# Patient Record
Sex: FEMALE | Race: WHITE | NOT HISPANIC OR LATINO | URBAN - METROPOLITAN AREA
[De-identification: names, ages, dates, MRNs, and addresses within clinical notes are randomized per-mention and may not be internally consistent; named-entity substitution may affect disease eponyms.]

---

## 2021-06-30 LAB
EXT 24 HR UR METANEPHRINE: ABNORMAL
EXT 24 HR UR NORMETANEPHRINE: ABNORMAL
EXT 24 HR UR NORMETANEPHRINE: ABNORMAL
EXT 25 HYDROXY VIT D2: ABNORMAL
EXT 25 HYDROXY VIT D3: ABNORMAL
EXT 5 HIAA 24 HR URINE: ABNORMAL
EXT 5 HIAA BLOOD: ABNORMAL
EXT ACTH: ABNORMAL
EXT AFP: ABNORMAL
EXT ALBUMIN: 4.2 G/DL (ref 3.2–4.8)
EXT ALKALINE PHOSPHATASE: 90 IU/L (ref 46–116)
EXT ALT: 20 U/L (ref 10–49)
EXT AMYLASE: ABNORMAL
EXT ANTI ISLET CELL AB: ABNORMAL
EXT ANTI PARIETAL CELL AB: ABNORMAL
EXT ANTI THYROID AB: ABNORMAL
EXT AST: 19 IU/L (ref 0–34)
EXT BILIRUBIN DIRECT: ABNORMAL
EXT BILIRUBIN TOTAL: 0.6 MG/DL (ref 0.3–1.2)
EXT BK VIRUS DNA QN PCR: ABNORMAL
EXT BUN: 10 MG/DL (ref 9–23)
EXT C PEPTIDE: ABNORMAL
EXT CA 125: ABNORMAL
EXT CA 19-9: ABNORMAL
EXT CA 27-29: ABNORMAL
EXT CALCITONIN: ABNORMAL
EXT CALCIUM: 8.9 MG/DL (ref 8.3–10.6)
EXT CEA: ABNORMAL
EXT CHLORIDE: 105 MMOL/L (ref 98–107)
EXT CHOLESTEROL: ABNORMAL
EXT CHROMOGRANIN A: 262 NG/ML (ref 0–96)
EXT CO2: 31 MMOL/L (ref 20–31)
EXT CREATININE UA: ABNORMAL
EXT CREATININE: 0.73 MG/DL (ref 0.55–1.02)
EXT CYCLOSPORONE LEVEL: ABNORMAL
EXT DOPAMINE: ABNORMAL
EXT EBV DNA BY PCR: ABNORMAL
EXT EPINEPHRINE: ABNORMAL
EXT FOLATE: ABNORMAL
EXT FREE T3: ABNORMAL
EXT FREE T4: ABNORMAL
EXT FSH: ABNORMAL
EXT GASTRIN RELEASING PEPTIDE: ABNORMAL
EXT GASTRIN RELEASING PEPTIDE: ABNORMAL
EXT GASTRIN: ABNORMAL
EXT GGT: ABNORMAL
EXT GHRELIN: ABNORMAL
EXT GLUCAGON: ABNORMAL
EXT GLUCOSE: 103 MG/DL (ref 70–99)
EXT GROWTH HORMONE: ABNORMAL
EXT HCV RNA QUANT PCR: ABNORMAL
EXT HDL: ABNORMAL
EXT HEMATOCRIT: 41.5 % (ref 34.3–45.7)
EXT HEMOGLOBIN A1C: ABNORMAL
EXT HEMOGLOBIN: 13.4 G/DL (ref 11.3–15.4)
EXT HISTAMINE 24 HR URINE: ABNORMAL
EXT HISTAMINE: ABNORMAL
EXT IGF-1: ABNORMAL
EXT IMMUNKNOW (NON-STIMULATED): ABNORMAL
EXT IMMUNKNOW (STIMULATED): ABNORMAL
EXT INR: ABNORMAL
EXT INSULIN: ABNORMAL
EXT LANREOTIDE LEVEL: ABNORMAL
EXT LDH, TOTAL: ABNORMAL
EXT LDL CHOLESTEROL: ABNORMAL
EXT LIPASE: ABNORMAL
EXT MAGNESIUM: ABNORMAL
EXT METANEPHRINE FREE PLASMA: ABNORMAL
EXT MOTILIN: ABNORMAL
EXT NEUROKININ A CAMB: ABNORMAL
EXT NEUROKININ A ISI: ABNORMAL
EXT NEUROTENSIN: ABNORMAL
EXT NOREPINEPHRINE: ABNORMAL
EXT NORMETANEPHRINE: ABNORMAL
EXT NSE: ABNORMAL
EXT OCTREOTIDE LEVEL: ABNORMAL
EXT PANCREASTATIN CAMB: ABNORMAL
EXT PANCREASTATIN ISI: ABNORMAL
EXT PANCREATIC POLYPEPTIDE: ABNORMAL
EXT PHOSPHORUS: ABNORMAL
EXT PLATELETS: 304 K/UL (ref 117–369)
EXT POTASSIUM: 3.1 MMOL/L (ref 3.5–5.1)
EXT PROGRAF LEVEL: ABNORMAL
EXT PROLACTIN: ABNORMAL
EXT PROTEIN TOTAL: 6 G/DL (ref 5.7–8.2)
EXT PROTEIN UA: ABNORMAL
EXT PT: ABNORMAL
EXT PTH, INTACT: ABNORMAL
EXT PTT: ABNORMAL
EXT RAPAMUNE LEVEL: ABNORMAL
EXT SEROTONIN: 1379 NG/ML (ref 50–220)
EXT SODIUM: 146 MMOL/L (ref 136–147)
EXT SOMATOSTATIN: ABNORMAL
EXT SUBSTANCE P: ABNORMAL
EXT TRIGLYCERIDES: ABNORMAL
EXT TRYPTASE: ABNORMAL
EXT TSH: ABNORMAL
EXT URIC ACID: ABNORMAL
EXT URINE AMYLASE U/HR: ABNORMAL
EXT URINE AMYLASE U/L: ABNORMAL
EXT VASOACTIVE INTESTINAL POLYPEPTIDE: ABNORMAL
EXT VITAMIN B12: ABNORMAL
EXT VMA 24 HR URINE: ABNORMAL
EXT WBC: 6.7 K/UL (ref 4.4–10.1)
NEURON SPECIFIC ENOLASE: ABNORMAL

## 2021-07-14 ENCOUNTER — TELEPHONE (OUTPATIENT)
Dept: NEUROLOGY | Facility: HOSPITAL | Age: 71
End: 2021-07-14

## 2021-07-16 DIAGNOSIS — C7B.8 SECONDARY NEUROENDOCRINE TUMOR OF LIVER: ICD-10-CM

## 2021-07-16 DIAGNOSIS — C7A.095 MALIGNANT CARCINOID TUMOR OF MIDGUT: Primary | ICD-10-CM

## 2021-07-19 ENCOUNTER — TELEPHONE (OUTPATIENT)
Dept: NEUROLOGY | Facility: HOSPITAL | Age: 71
End: 2021-07-19

## 2021-07-20 ENCOUNTER — TELEPHONE (OUTPATIENT)
Dept: NEUROLOGY | Facility: HOSPITAL | Age: 71
End: 2021-07-20

## 2021-07-20 LAB
EXT 24 HR UR METANEPHRINE: ABNORMAL
EXT 24 HR UR NORMETANEPHRINE: ABNORMAL
EXT 24 HR UR NORMETANEPHRINE: ABNORMAL
EXT 25 HYDROXY VIT D2: ABNORMAL
EXT 25 HYDROXY VIT D3: ABNORMAL
EXT 5 HIAA 24 HR URINE: ABNORMAL
EXT 5 HIAA BLOOD: ABNORMAL
EXT ACTH: ABNORMAL
EXT AFP: ABNORMAL
EXT ALBUMIN: ABNORMAL
EXT ALKALINE PHOSPHATASE: ABNORMAL
EXT ALT: ABNORMAL
EXT AMYLASE: ABNORMAL
EXT ANTI ISLET CELL AB: ABNORMAL
EXT ANTI PARIETAL CELL AB: ABNORMAL
EXT ANTI THYROID AB: ABNORMAL
EXT AST: ABNORMAL
EXT BILIRUBIN DIRECT: ABNORMAL
EXT BILIRUBIN TOTAL: ABNORMAL
EXT BK VIRUS DNA QN PCR: ABNORMAL
EXT BUN: ABNORMAL
EXT C PEPTIDE: ABNORMAL
EXT CA 125: ABNORMAL
EXT CA 19-9: ABNORMAL
EXT CA 27-29: ABNORMAL
EXT CALCITONIN: ABNORMAL
EXT CALCIUM: ABNORMAL
EXT CEA: ABNORMAL
EXT CHLORIDE: ABNORMAL
EXT CHOLESTEROL: ABNORMAL
EXT CHROMOGRANIN A: ABNORMAL
EXT CO2: ABNORMAL
EXT CREATININE UA: ABNORMAL
EXT CREATININE: ABNORMAL
EXT CYCLOSPORONE LEVEL: ABNORMAL
EXT DOPAMINE: ABNORMAL
EXT EBV DNA BY PCR: ABNORMAL
EXT EPINEPHRINE: ABNORMAL
EXT FOLATE: ABNORMAL
EXT FREE T3: ABNORMAL
EXT FREE T4: ABNORMAL
EXT FSH: ABNORMAL
EXT GASTRIN RELEASING PEPTIDE: ABNORMAL
EXT GASTRIN RELEASING PEPTIDE: ABNORMAL
EXT GASTRIN: ABNORMAL
EXT GGT: ABNORMAL
EXT GHRELIN: ABNORMAL
EXT GLUCAGON: ABNORMAL
EXT GLUCOSE: ABNORMAL
EXT GROWTH HORMONE: ABNORMAL
EXT HCV RNA QUANT PCR: ABNORMAL
EXT HDL: ABNORMAL
EXT HEMATOCRIT: ABNORMAL
EXT HEMOGLOBIN A1C: ABNORMAL
EXT HEMOGLOBIN: ABNORMAL
EXT HISTAMINE 24 HR URINE: ABNORMAL
EXT HISTAMINE: ABNORMAL
EXT IGF-1: ABNORMAL
EXT IMMUNKNOW (NON-STIMULATED): ABNORMAL
EXT IMMUNKNOW (STIMULATED): ABNORMAL
EXT INR: ABNORMAL
EXT INSULIN: ABNORMAL
EXT LANREOTIDE LEVEL: ABNORMAL
EXT LDH, TOTAL: ABNORMAL
EXT LDL CHOLESTEROL: ABNORMAL
EXT LIPASE: ABNORMAL
EXT MAGNESIUM: ABNORMAL
EXT METANEPHRINE FREE PLASMA: ABNORMAL
EXT MOTILIN: ABNORMAL
EXT NEUROKININ A CAMB: ABNORMAL
EXT NEUROKININ A ISI: ABNORMAL
EXT NEUROTENSIN: ABNORMAL
EXT NOREPINEPHRINE: ABNORMAL
EXT NORMETANEPHRINE: ABNORMAL
EXT NSE: ABNORMAL
EXT OCTREOTIDE LEVEL: ABNORMAL
EXT PANCREASTATIN CAMB: ABNORMAL
EXT PANCREASTATIN ISI: 852.8 PG/ML (ref 100–288.7)
EXT PANCREATIC POLYPEPTIDE: ABNORMAL
EXT PHOSPHORUS: ABNORMAL
EXT PLATELETS: ABNORMAL
EXT POTASSIUM: ABNORMAL
EXT PROGRAF LEVEL: ABNORMAL
EXT PROLACTIN: ABNORMAL
EXT PROTEIN TOTAL: ABNORMAL
EXT PROTEIN UA: ABNORMAL
EXT PT: ABNORMAL
EXT PTH, INTACT: ABNORMAL
EXT PTT: ABNORMAL
EXT RAPAMUNE LEVEL: ABNORMAL
EXT SEROTONIN: 1661 NG/ML (ref 50–220)
EXT SODIUM: ABNORMAL
EXT SOMATOSTATIN: ABNORMAL
EXT SUBSTANCE P: ABNORMAL
EXT TRIGLYCERIDES: ABNORMAL
EXT TRYPTASE: ABNORMAL
EXT TSH: ABNORMAL
EXT URIC ACID: ABNORMAL
EXT URINE AMYLASE U/HR: ABNORMAL
EXT URINE AMYLASE U/L: ABNORMAL
EXT VASOACTIVE INTESTINAL POLYPEPTIDE: ABNORMAL
EXT VITAMIN B12: ABNORMAL
EXT VMA 24 HR URINE: ABNORMAL
EXT WBC: ABNORMAL
NEURON SPECIFIC ENOLASE: ABNORMAL

## 2021-07-22 ENCOUNTER — DOCUMENTATION ONLY (OUTPATIENT)
Dept: NEUROLOGY | Facility: HOSPITAL | Age: 71
End: 2021-07-22

## 2021-07-26 ENCOUNTER — OFFICE VISIT (OUTPATIENT)
Dept: NEUROLOGY | Facility: HOSPITAL | Age: 71
End: 2021-07-26
Attending: SURGERY
Payer: MEDICARE

## 2021-07-26 DIAGNOSIS — C7A.095 MALIGNANT CARCINOID TUMOR OF MIDGUT: Primary | ICD-10-CM

## 2021-07-26 DIAGNOSIS — E34.0 CARCINOID SYNDROME: ICD-10-CM

## 2021-07-26 DIAGNOSIS — E31.20 MEN (MULTIPLE ENDOCRINE NEOPLASIA): ICD-10-CM

## 2021-07-26 DIAGNOSIS — C7B.8 SECONDARY NEUROENDOCRINE TUMOR OF LIVER: ICD-10-CM

## 2021-07-26 RX ORDER — PREGABALIN 75 MG/1
75 CAPSULE ORAL
Status: CANCELLED | OUTPATIENT
Start: 2021-07-26

## 2021-07-26 RX ORDER — ONDANSETRON 2 MG/ML
8 INJECTION INTRAMUSCULAR; INTRAVENOUS
Status: CANCELLED | OUTPATIENT
Start: 2021-07-26

## 2021-07-28 ENCOUNTER — TELEPHONE (OUTPATIENT)
Dept: NEUROLOGY | Facility: HOSPITAL | Age: 71
End: 2021-07-28

## 2021-07-30 ENCOUNTER — TELEPHONE (OUTPATIENT)
Dept: NEUROLOGY | Facility: HOSPITAL | Age: 71
End: 2021-07-30

## 2021-08-02 ENCOUNTER — CONFERENCE (OUTPATIENT)
Dept: NEUROLOGY | Facility: HOSPITAL | Age: 71
End: 2021-08-02

## 2021-08-03 ENCOUNTER — PATIENT MESSAGE (OUTPATIENT)
Dept: NEUROLOGY | Facility: HOSPITAL | Age: 71
End: 2021-08-03

## 2021-08-03 DIAGNOSIS — C7B.8 SECONDARY NEUROENDOCRINE TUMOR OF LIVER: Primary | ICD-10-CM

## 2021-08-17 ENCOUNTER — PATIENT MESSAGE (OUTPATIENT)
Dept: NEUROLOGY | Facility: HOSPITAL | Age: 71
End: 2021-08-17

## 2021-08-20 ENCOUNTER — PATIENT MESSAGE (OUTPATIENT)
Dept: NEUROLOGY | Facility: HOSPITAL | Age: 71
End: 2021-08-20

## 2021-08-25 ENCOUNTER — TELEPHONE (OUTPATIENT)
Dept: NEUROLOGY | Facility: HOSPITAL | Age: 71
End: 2021-08-25

## 2021-08-25 ENCOUNTER — PATIENT MESSAGE (OUTPATIENT)
Dept: NEUROLOGY | Facility: HOSPITAL | Age: 71
End: 2021-08-25

## 2021-08-25 DIAGNOSIS — Z01.818 PRE-OP TESTING: ICD-10-CM

## 2021-08-27 LAB
EXT 24 HR UR METANEPHRINE: ABNORMAL
EXT 24 HR UR NORMETANEPHRINE: ABNORMAL
EXT 24 HR UR NORMETANEPHRINE: ABNORMAL
EXT 25 HYDROXY VIT D2: ABNORMAL
EXT 25 HYDROXY VIT D3: ABNORMAL
EXT 5 HIAA 24 HR URINE: ABNORMAL
EXT 5 HIAA BLOOD: ABNORMAL
EXT ACTH: ABNORMAL
EXT AFP: ABNORMAL
EXT ALBUMIN: 4.6 G/DL (ref 3.2–4.8)
EXT ALKALINE PHOSPHATASE: 96 IU/L (ref 46–116)
EXT ALT: 14 IU/L (ref 10–49)
EXT AMYLASE: ABNORMAL
EXT ANTI ISLET CELL AB: ABNORMAL
EXT ANTI PARIETAL CELL AB: ABNORMAL
EXT ANTI THYROID AB: ABNORMAL
EXT AST: 19 IU/L (ref 0–34)
EXT BILIRUBIN DIRECT: ABNORMAL
EXT BILIRUBIN TOTAL: 0.8 MG/DL (ref 0.3–1.2)
EXT BK VIRUS DNA QN PCR: ABNORMAL
EXT BUN: 8 MG/DL (ref 9–23)
EXT C PEPTIDE: ABNORMAL
EXT CA 125: ABNORMAL
EXT CA 19-9: ABNORMAL
EXT CA 27-29: ABNORMAL
EXT CALCITONIN: ABNORMAL
EXT CALCIUM: 10 MG/DL (ref 8.3–10.6)
EXT CEA: ABNORMAL
EXT CHLORIDE: 106 MMOL/L (ref 98–107)
EXT CHOLESTEROL: ABNORMAL
EXT CHROMOGRANIN A: ABNORMAL
EXT CO2: 28 MMOL/L (ref 20–31)
EXT CREATININE UA: ABNORMAL
EXT CREATININE: 0.86 MG/DL (ref 0.55–1.02)
EXT CYCLOSPORONE LEVEL: ABNORMAL
EXT DOPAMINE: ABNORMAL
EXT EBV DNA BY PCR: ABNORMAL
EXT EPINEPHRINE: ABNORMAL
EXT FOLATE: ABNORMAL
EXT FREE T3: ABNORMAL
EXT FREE T4: ABNORMAL
EXT FSH: ABNORMAL
EXT GASTRIN RELEASING PEPTIDE: ABNORMAL
EXT GASTRIN RELEASING PEPTIDE: ABNORMAL
EXT GASTRIN: ABNORMAL
EXT GGT: ABNORMAL
EXT GHRELIN: ABNORMAL
EXT GLUCAGON: ABNORMAL
EXT GLUCOSE: 173 MG/DL (ref 70–99)
EXT GROWTH HORMONE: ABNORMAL
EXT HCV RNA QUANT PCR: ABNORMAL
EXT HDL: ABNORMAL
EXT HEMATOCRIT: 44.3 % (ref 34.3–45.7)
EXT HEMOGLOBIN A1C: ABNORMAL
EXT HEMOGLOBIN: 14.7 G/DL (ref 11.3–15.4)
EXT HISTAMINE 24 HR URINE: ABNORMAL
EXT HISTAMINE: ABNORMAL
EXT IGF-1: ABNORMAL
EXT IMMUNKNOW (NON-STIMULATED): ABNORMAL
EXT IMMUNKNOW (STIMULATED): ABNORMAL
EXT INR: ABNORMAL
EXT INSULIN: ABNORMAL
EXT LANREOTIDE LEVEL: ABNORMAL
EXT LDH, TOTAL: ABNORMAL
EXT LDL CHOLESTEROL: ABNORMAL
EXT LIPASE: ABNORMAL
EXT MAGNESIUM: ABNORMAL
EXT METANEPHRINE FREE PLASMA: ABNORMAL
EXT MOTILIN: ABNORMAL
EXT NEUROKININ A CAMB: ABNORMAL
EXT NEUROKININ A ISI: ABNORMAL
EXT NEUROTENSIN: ABNORMAL
EXT NOREPINEPHRINE: ABNORMAL
EXT NORMETANEPHRINE: ABNORMAL
EXT NSE: ABNORMAL
EXT OCTREOTIDE LEVEL: ABNORMAL
EXT PANCREASTATIN CAMB: ABNORMAL
EXT PANCREASTATIN ISI: ABNORMAL
EXT PANCREATIC POLYPEPTIDE: ABNORMAL
EXT PHOSPHORUS: ABNORMAL
EXT PLATELETS: 313 K/UL (ref 117–369)
EXT POTASSIUM: 3.7 MMOL/L (ref 3.5–5.1)
EXT PROGRAF LEVEL: ABNORMAL
EXT PROLACTIN: ABNORMAL
EXT PROTEIN TOTAL: 6.7 G/DL (ref 5.7–8.2)
EXT PROTEIN UA: ABNORMAL
EXT PT: ABNORMAL
EXT PTH, INTACT: ABNORMAL
EXT PTT: 28.8 SEC (ref 22.2–34.1)
EXT RAPAMUNE LEVEL: ABNORMAL
EXT SEROTONIN: ABNORMAL
EXT SODIUM: 142 MMOL/L (ref 136–147)
EXT SOMATOSTATIN: ABNORMAL
EXT SUBSTANCE P: ABNORMAL
EXT TRIGLYCERIDES: ABNORMAL
EXT TRYPTASE: ABNORMAL
EXT TSH: ABNORMAL
EXT URIC ACID: ABNORMAL
EXT URINE AMYLASE U/HR: ABNORMAL
EXT URINE AMYLASE U/L: ABNORMAL
EXT VASOACTIVE INTESTINAL POLYPEPTIDE: ABNORMAL
EXT VITAMIN B12: ABNORMAL
EXT VMA 24 HR URINE: ABNORMAL
EXT WBC: 6.9 K/UL (ref 4.4–10.1)
NEURON SPECIFIC ENOLASE: ABNORMAL

## 2021-09-20 ENCOUNTER — PATIENT MESSAGE (OUTPATIENT)
Dept: NEUROLOGY | Facility: HOSPITAL | Age: 71
End: 2021-09-20

## 2021-09-21 LAB
EXT 24 HR UR METANEPHRINE: ABNORMAL
EXT 24 HR UR NORMETANEPHRINE: ABNORMAL
EXT 24 HR UR NORMETANEPHRINE: ABNORMAL
EXT 25 HYDROXY VIT D2: ABNORMAL
EXT 25 HYDROXY VIT D3: ABNORMAL
EXT 5 HIAA 24 HR URINE: ABNORMAL
EXT 5 HIAA BLOOD: ABNORMAL
EXT ACTH: ABNORMAL
EXT AFP: ABNORMAL
EXT ALBUMIN: 4.1 G/DL (ref 3.2–4.8)
EXT ALKALINE PHOSPHATASE: 79 IU/L (ref 46–116)
EXT ALT: 14 IU/L (ref 10–49)
EXT AMYLASE: ABNORMAL
EXT ANTI ISLET CELL AB: ABNORMAL
EXT ANTI PARIETAL CELL AB: ABNORMAL
EXT ANTI THYROID AB: ABNORMAL
EXT AST: 17 IU/L (ref 0–34)
EXT BILIRUBIN DIRECT: ABNORMAL
EXT BILIRUBIN TOTAL: 0.7 MG/DL (ref 0.3–1.2)
EXT BK VIRUS DNA QN PCR: ABNORMAL
EXT BUN: 10 MG/DL (ref 9–23)
EXT C PEPTIDE: ABNORMAL
EXT CA 125: ABNORMAL
EXT CA 19-9: ABNORMAL
EXT CA 27-29: ABNORMAL
EXT CALCITONIN: ABNORMAL
EXT CALCIUM: 9.4 MG/DL (ref 8.3–10.6)
EXT CEA: ABNORMAL
EXT CHLORIDE: 108 MMOL/L (ref 98–107)
EXT CHOLESTEROL: ABNORMAL
EXT CHROMOGRANIN A: ABNORMAL
EXT CO2: 30 MMOL/L (ref 20–31)
EXT CREATININE UA: ABNORMAL
EXT CREATININE: 0.76 MG/DL (ref 0.55–1.02)
EXT CYCLOSPORONE LEVEL: ABNORMAL
EXT DOPAMINE: ABNORMAL
EXT EBV DNA BY PCR: ABNORMAL
EXT EPINEPHRINE: ABNORMAL
EXT FOLATE: ABNORMAL
EXT FREE T3: ABNORMAL
EXT FREE T4: ABNORMAL
EXT FSH: ABNORMAL
EXT GASTRIN RELEASING PEPTIDE: ABNORMAL
EXT GASTRIN RELEASING PEPTIDE: ABNORMAL
EXT GASTRIN: ABNORMAL
EXT GGT: ABNORMAL
EXT GHRELIN: ABNORMAL
EXT GLUCAGON: ABNORMAL
EXT GLUCOSE: 106 MG/DL (ref 70–99)
EXT GROWTH HORMONE: ABNORMAL
EXT HCV RNA QUANT PCR: ABNORMAL
EXT HDL: ABNORMAL
EXT HEMATOCRIT: 42.5 % (ref 34.3–45.7)
EXT HEMOGLOBIN A1C: ABNORMAL
EXT HEMOGLOBIN: 13.2 G/DL (ref 11.3–15.4)
EXT HISTAMINE 24 HR URINE: ABNORMAL
EXT HISTAMINE: ABNORMAL
EXT IGF-1: ABNORMAL
EXT IMMUNKNOW (NON-STIMULATED): ABNORMAL
EXT IMMUNKNOW (STIMULATED): ABNORMAL
EXT INR: 1.05 INR (ref 0.86–1.15)
EXT INSULIN: ABNORMAL
EXT LANREOTIDE LEVEL: ABNORMAL
EXT LDH, TOTAL: ABNORMAL
EXT LDL CHOLESTEROL: ABNORMAL
EXT LIPASE: ABNORMAL
EXT MAGNESIUM: ABNORMAL
EXT METANEPHRINE FREE PLASMA: ABNORMAL
EXT MOTILIN: ABNORMAL
EXT NEUROKININ A CAMB: ABNORMAL
EXT NEUROKININ A ISI: ABNORMAL
EXT NEUROTENSIN: ABNORMAL
EXT NOREPINEPHRINE: ABNORMAL
EXT NORMETANEPHRINE: ABNORMAL
EXT NSE: ABNORMAL
EXT OCTREOTIDE LEVEL: ABNORMAL
EXT PANCREASTATIN CAMB: ABNORMAL
EXT PANCREASTATIN ISI: ABNORMAL
EXT PANCREATIC POLYPEPTIDE: ABNORMAL
EXT PHOSPHORUS: ABNORMAL
EXT PLATELETS: 295 K/UL (ref 117–369)
EXT POTASSIUM: 3.7 MMOL/L (ref 3.5–5.1)
EXT PROGRAF LEVEL: ABNORMAL
EXT PROLACTIN: ABNORMAL
EXT PROTEIN TOTAL: 5.9 G/DL (ref 5.7–8.2)
EXT PROTEIN UA: ABNORMAL
EXT PT: 13.9 SEC (ref 12.7–15.3)
EXT PTH, INTACT: ABNORMAL
EXT PTT: 29.8 SEC (ref 22.2–34.1)
EXT RAPAMUNE LEVEL: ABNORMAL
EXT SEROTONIN: ABNORMAL
EXT SODIUM: 144 MMOL/L (ref 136–147)
EXT SOMATOSTATIN: ABNORMAL
EXT SUBSTANCE P: ABNORMAL
EXT TRIGLYCERIDES: ABNORMAL
EXT TRYPTASE: ABNORMAL
EXT TSH: ABNORMAL
EXT URIC ACID: ABNORMAL
EXT URINE AMYLASE U/HR: ABNORMAL
EXT URINE AMYLASE U/L: ABNORMAL
EXT VASOACTIVE INTESTINAL POLYPEPTIDE: ABNORMAL
EXT VITAMIN B12: ABNORMAL
EXT VMA 24 HR URINE: ABNORMAL
EXT WBC: 5.4 K/UL (ref 4.4–10.1)
NEURON SPECIFIC ENOLASE: ABNORMAL

## 2021-09-22 ENCOUNTER — OFFICE VISIT (OUTPATIENT)
Dept: NEUROLOGY | Facility: HOSPITAL | Age: 71
End: 2021-09-22
Attending: SURGERY
Payer: MEDICARE

## 2021-09-22 DIAGNOSIS — C7A.095 MALIGNANT CARCINOID TUMOR OF MIDGUT: ICD-10-CM

## 2021-09-22 DIAGNOSIS — C7B.8 SECONDARY NEUROENDOCRINE TUMOR OF LIVER: Primary | ICD-10-CM

## 2021-09-23 ENCOUNTER — TELEPHONE (OUTPATIENT)
Dept: INTERVENTIONAL RADIOLOGY/VASCULAR | Facility: HOSPITAL | Age: 71
End: 2021-09-23

## 2021-09-23 RX ORDER — HYDROMORPHONE HYDROCHLORIDE 1 MG/ML
1 INJECTION, SOLUTION INTRAMUSCULAR; INTRAVENOUS; SUBCUTANEOUS EVERY 4 HOURS PRN
Status: CANCELLED | OUTPATIENT
Start: 2021-09-23

## 2021-09-23 RX ORDER — SODIUM CHLORIDE 0.9 % (FLUSH) 0.9 %
10 SYRINGE (ML) INJECTION
Status: CANCELLED | OUTPATIENT
Start: 2021-09-23

## 2021-09-23 RX ORDER — HYDROCODONE BITARTRATE AND ACETAMINOPHEN 5; 325 MG/1; MG/1
1 TABLET ORAL EVERY 4 HOURS PRN
Status: CANCELLED | OUTPATIENT
Start: 2021-09-23

## 2021-09-23 RX ORDER — TALC
6 POWDER (GRAM) TOPICAL NIGHTLY PRN
Status: CANCELLED | OUTPATIENT
Start: 2021-09-23

## 2021-09-23 RX ORDER — DIPHENHYDRAMINE HYDROCHLORIDE 50 MG/ML
50 INJECTION INTRAMUSCULAR; INTRAVENOUS ONCE
Status: CANCELLED | OUTPATIENT
Start: 2021-09-23 | End: 2021-09-23

## 2021-09-23 RX ORDER — MAGNESIUM SULFATE HEPTAHYDRATE 40 MG/ML
2 INJECTION, SOLUTION INTRAVENOUS ONCE
Status: CANCELLED | OUTPATIENT
Start: 2021-09-23 | End: 2021-09-23

## 2021-09-23 RX ORDER — SODIUM CHLORIDE 9 MG/ML
INJECTION, SOLUTION INTRAVENOUS ONCE
Status: CANCELLED | OUTPATIENT
Start: 2021-09-23 | End: 2021-09-23

## 2021-09-23 RX ORDER — ONDANSETRON 8 MG/1
8 TABLET, ORALLY DISINTEGRATING ORAL EVERY 8 HOURS PRN
Status: CANCELLED | OUTPATIENT
Start: 2021-09-23

## 2021-09-23 RX ORDER — DEXTROSE MONOHYDRATE, SODIUM CHLORIDE, AND POTASSIUM CHLORIDE 50; 1.49; 4.5 G/1000ML; G/1000ML; G/1000ML
INJECTION, SOLUTION INTRAVENOUS CONTINUOUS
Status: CANCELLED | OUTPATIENT
Start: 2021-09-23

## 2021-09-23 RX ORDER — IBUPROFEN 600 MG/1
600 TABLET ORAL EVERY 6 HOURS PRN
Status: CANCELLED | OUTPATIENT
Start: 2021-09-23

## 2021-09-23 RX ORDER — METOCLOPRAMIDE HYDROCHLORIDE 5 MG/ML
5 INJECTION INTRAMUSCULAR; INTRAVENOUS EVERY 6 HOURS PRN
Status: CANCELLED | OUTPATIENT
Start: 2021-09-23

## 2021-09-23 RX ORDER — DEXTROSE MONOHYDRATE, SODIUM CHLORIDE, AND POTASSIUM CHLORIDE 50; 1.49; 4.5 G/1000ML; G/1000ML; G/1000ML
75 INJECTION, SOLUTION INTRAVENOUS CONTINUOUS
Status: CANCELLED | OUTPATIENT
Start: 2021-09-23

## 2021-09-24 ENCOUNTER — CLINICAL SUPPORT (OUTPATIENT)
Dept: NEUROLOGY | Facility: HOSPITAL | Age: 71
End: 2021-09-24
Attending: SURGERY
Payer: MEDICARE

## 2021-09-24 ENCOUNTER — HOSPITAL ENCOUNTER (OUTPATIENT)
Dept: INTERVENTIONAL RADIOLOGY/VASCULAR | Facility: HOSPITAL | Age: 71
Discharge: HOME | End: 2021-09-25
Attending: SURGERY | Admitting: SURGERY
Payer: MEDICARE

## 2021-09-24 DIAGNOSIS — T81.718A POST-EMBOLIZATION SYNDROME FOLLOWING CHEMOEMBOLIZATION OF LIVER, INITIAL ENCOUNTER: ICD-10-CM

## 2021-09-24 DIAGNOSIS — R52 PAIN MANAGEMENT: ICD-10-CM

## 2021-09-24 DIAGNOSIS — C7B.8 SECONDARY NEUROENDOCRINE TUMOR OF LIVER: ICD-10-CM

## 2021-09-24 DIAGNOSIS — Z01.818 PRE-OP TESTING: ICD-10-CM

## 2021-09-24 LAB — SARS-COV-2 RDRP RESP QL NAA+PROBE: NEGATIVE

## 2021-09-24 PROCEDURE — 96420 CHEMO IA PUSH TECNIQUE: CPT | Mod: ,,, | Performed by: RADIOLOGY

## 2021-09-24 PROCEDURE — 75726 ARTERY X-RAYS ABDOMEN: CPT | Mod: 26,59,, | Performed by: RADIOLOGY

## 2021-09-24 PROCEDURE — 37243 IR EMBOLIZATION COMP FOR TUMOR_ORGAN ISCHEMIA_INFARC: ICD-10-PCS | Mod: ,,, | Performed by: RADIOLOGY

## 2021-09-24 PROCEDURE — 75726 ARTERY X-RAYS ABDOMEN: CPT | Mod: TC | Performed by: RADIOLOGY

## 2021-09-24 PROCEDURE — 63600175 PHARM REV CODE 636 W HCPCS: Performed by: SURGERY

## 2021-09-24 PROCEDURE — 99152 MOD SED SAME PHYS/QHP 5/>YRS: CPT

## 2021-09-24 PROCEDURE — 76377 3D RENDER W/INTRP POSTPROCES: CPT | Mod: TC | Performed by: RADIOLOGY

## 2021-09-24 PROCEDURE — 36247 INS CATH ABD/L-EXT ART 3RD: CPT | Mod: 51,,, | Performed by: RADIOLOGY

## 2021-09-24 PROCEDURE — 76937 US GUIDE VASCULAR ACCESS: CPT | Mod: 26,,, | Performed by: RADIOLOGY

## 2021-09-24 PROCEDURE — 75887 PR  PERCUT XHEPATIC PORTOGRAM: ICD-10-PCS | Mod: 26,,, | Performed by: RADIOLOGY

## 2021-09-24 PROCEDURE — U0002 COVID-19 LAB TEST NON-CDC: HCPCS | Performed by: SURGERY

## 2021-09-24 PROCEDURE — 76937 US GUIDE VASCULAR ACCESS: CPT | Mod: TC | Performed by: RADIOLOGY

## 2021-09-24 PROCEDURE — 75887 VEIN X-RAY LIVER W/O HEMODYN: CPT | Mod: 26,,, | Performed by: RADIOLOGY

## 2021-09-24 PROCEDURE — 76377 3D RENDER W/INTRP POSTPROCES: CPT | Mod: 26,,, | Performed by: RADIOLOGY

## 2021-09-24 PROCEDURE — 37243 VASC EMBOLIZE/OCCLUDE ORGAN: CPT | Performed by: RADIOLOGY

## 2021-09-24 PROCEDURE — 99153 MOD SED SAME PHYS/QHP EA: CPT | Performed by: RADIOLOGY

## 2021-09-24 PROCEDURE — 96420 PR CHEMOTHER,IA PUSH TECHNIQUE: ICD-10-PCS | Mod: ,,, | Performed by: RADIOLOGY

## 2021-09-24 PROCEDURE — 76937 PR  US GUIDE, VASCULAR ACCESS: ICD-10-PCS | Mod: 26,,, | Performed by: RADIOLOGY

## 2021-09-24 PROCEDURE — 77263 PR  RADIATION THERAPY PLAN COMPLEX: ICD-10-PCS | Mod: ,,, | Performed by: RADIOLOGY

## 2021-09-24 PROCEDURE — 63600175 PHARM REV CODE 636 W HCPCS: Performed by: RADIOLOGY

## 2021-09-24 PROCEDURE — 25500020 PHARM REV CODE 255: Performed by: RADIOLOGY

## 2021-09-24 PROCEDURE — 99211 OFF/OP EST MAY X REQ PHY/QHP: CPT

## 2021-09-24 PROCEDURE — 75774 ARTERY X-RAY EACH VESSEL: CPT | Mod: 26,59,, | Performed by: RADIOLOGY

## 2021-09-24 PROCEDURE — 25000003 PHARM REV CODE 250: Performed by: SURGERY

## 2021-09-24 PROCEDURE — 36248 INS CATH ABD/L-EXT ART ADDL: CPT | Mod: ,,, | Performed by: RADIOLOGY

## 2021-09-24 PROCEDURE — 36248 INS CATH ABD/L-EXT ART ADDL: CPT | Performed by: RADIOLOGY

## 2021-09-24 PROCEDURE — 36247 INS CATH ABD/L-EXT ART 3RD: CPT | Mod: LT | Performed by: RADIOLOGY

## 2021-09-24 PROCEDURE — 36247 PR PLACE CATH SUBSUBSELECT ART,ABD/PEL: ICD-10-PCS | Mod: 51,,, | Performed by: RADIOLOGY

## 2021-09-24 PROCEDURE — 99152 MOD SED SAME PHYS/QHP 5/>YRS: CPT | Performed by: RADIOLOGY

## 2021-09-24 PROCEDURE — 77263 THER RADIOLOGY TX PLNG CPLX: CPT | Mod: ,,, | Performed by: RADIOLOGY

## 2021-09-24 PROCEDURE — 76380 CAT SCAN FOLLOW-UP STUDY: CPT | Mod: 26,59,, | Performed by: RADIOLOGY

## 2021-09-24 PROCEDURE — 75726 CHG ANGIO VISCERAL SELECTV/SUBSELEC: ICD-10-PCS | Mod: 26,59,, | Performed by: RADIOLOGY

## 2021-09-24 PROCEDURE — 36248 PR PR INS CATH ABD/L-EXT ART ADDL 2ND ORD/3RD ORD/BYD: ICD-10-PCS | Mod: ,,, | Performed by: RADIOLOGY

## 2021-09-24 PROCEDURE — 76377 PR  3D RENDERING W/ IMAGE POSTPROCESS: ICD-10-PCS | Mod: 26,,, | Performed by: RADIOLOGY

## 2021-09-24 PROCEDURE — 25000003 PHARM REV CODE 250: Performed by: RADIOLOGY

## 2021-09-24 PROCEDURE — C1894 INTRO/SHEATH, NON-LASER: HCPCS

## 2021-09-24 PROCEDURE — 76380 CAT SCAN FOLLOW-UP STUDY: CPT | Mod: TC,59 | Performed by: RADIOLOGY

## 2021-09-24 PROCEDURE — 75774 ARTERY X-RAY EACH VESSEL: CPT | Mod: TC | Performed by: RADIOLOGY

## 2021-09-24 PROCEDURE — 96420 CHEMO IA PUSH TECNIQUE: CPT | Performed by: RADIOLOGY

## 2021-09-24 PROCEDURE — 99153 MOD SED SAME PHYS/QHP EA: CPT

## 2021-09-24 PROCEDURE — 76380 PR  CT SCAN,LIMITED/LOCALIZED F/U STUDY: ICD-10-PCS | Mod: 26,59,, | Performed by: RADIOLOGY

## 2021-09-24 PROCEDURE — 75774 PR  ANGIO EA ADDNL SELECTV VESSEL: ICD-10-PCS | Mod: 26,59,, | Performed by: RADIOLOGY

## 2021-09-24 PROCEDURE — C1769 GUIDE WIRE: HCPCS

## 2021-09-24 PROCEDURE — G0269 OCCLUSIVE DEVICE IN VEIN ART: HCPCS | Performed by: RADIOLOGY

## 2021-09-24 PROCEDURE — 75887 VEIN X-RAY LIVER W/O HEMODYN: CPT | Mod: TC | Performed by: RADIOLOGY

## 2021-09-24 RX ORDER — TALC
6 POWDER (GRAM) TOPICAL NIGHTLY PRN
Status: DISCONTINUED | OUTPATIENT
Start: 2021-09-24 | End: 2021-09-25 | Stop reason: HOSPADM

## 2021-09-24 RX ORDER — LIDOCAINE HYDROCHLORIDE 10 MG/ML
INJECTION INFILTRATION; PERINEURAL CODE/TRAUMA/SEDATION MEDICATION
Status: COMPLETED | OUTPATIENT
Start: 2021-09-24 | End: 2021-09-24

## 2021-09-24 RX ORDER — METOCLOPRAMIDE HYDROCHLORIDE 5 MG/ML
5 INJECTION INTRAMUSCULAR; INTRAVENOUS EVERY 6 HOURS PRN
Status: DISCONTINUED | OUTPATIENT
Start: 2021-09-24 | End: 2021-09-25 | Stop reason: HOSPADM

## 2021-09-24 RX ORDER — IBUPROFEN 600 MG/1
600 TABLET ORAL EVERY 6 HOURS PRN
Status: DISCONTINUED | OUTPATIENT
Start: 2021-09-24 | End: 2021-09-25 | Stop reason: HOSPADM

## 2021-09-24 RX ORDER — HEPARIN SODIUM 200 [USP'U]/100ML
INJECTION, SOLUTION INTRAVENOUS
Status: COMPLETED | OUTPATIENT
Start: 2021-09-24 | End: 2021-09-24

## 2021-09-24 RX ORDER — HYDRALAZINE HYDROCHLORIDE 20 MG/ML
INJECTION INTRAMUSCULAR; INTRAVENOUS CODE/TRAUMA/SEDATION MEDICATION
Status: COMPLETED | OUTPATIENT
Start: 2021-09-24 | End: 2021-09-24

## 2021-09-24 RX ORDER — PROCHLORPERAZINE EDISYLATE 5 MG/ML
INJECTION INTRAMUSCULAR; INTRAVENOUS CODE/TRAUMA/SEDATION MEDICATION
Status: COMPLETED | OUTPATIENT
Start: 2021-09-24 | End: 2021-09-24

## 2021-09-24 RX ORDER — MIDAZOLAM HYDROCHLORIDE 1 MG/ML
INJECTION INTRAMUSCULAR; INTRAVENOUS CODE/TRAUMA/SEDATION MEDICATION
Status: COMPLETED | OUTPATIENT
Start: 2021-09-24 | End: 2021-09-24

## 2021-09-24 RX ORDER — SODIUM CHLORIDE 0.9 % (FLUSH) 0.9 %
10 SYRINGE (ML) INJECTION
Status: DISCONTINUED | OUTPATIENT
Start: 2021-09-24 | End: 2021-09-25 | Stop reason: HOSPADM

## 2021-09-24 RX ORDER — FENTANYL CITRATE 50 UG/ML
INJECTION, SOLUTION INTRAMUSCULAR; INTRAVENOUS CODE/TRAUMA/SEDATION MEDICATION
Status: COMPLETED | OUTPATIENT
Start: 2021-09-24 | End: 2021-09-24

## 2021-09-24 RX ORDER — PROMETHAZINE HYDROCHLORIDE 25 MG/ML
INJECTION, SOLUTION INTRAMUSCULAR; INTRAVENOUS CODE/TRAUMA/SEDATION MEDICATION
Status: COMPLETED | OUTPATIENT
Start: 2021-09-24 | End: 2021-09-24

## 2021-09-24 RX ORDER — DEXTROSE MONOHYDRATE, SODIUM CHLORIDE, AND POTASSIUM CHLORIDE 50; 1.49; 4.5 G/1000ML; G/1000ML; G/1000ML
75 INJECTION, SOLUTION INTRAVENOUS CONTINUOUS
Status: DISCONTINUED | OUTPATIENT
Start: 2021-09-24 | End: 2021-09-25

## 2021-09-24 RX ORDER — LISINOPRIL 40 MG/1
40 TABLET ORAL DAILY
COMMUNITY

## 2021-09-24 RX ORDER — DEXTROSE MONOHYDRATE, SODIUM CHLORIDE, AND POTASSIUM CHLORIDE 50; 1.49; 4.5 G/1000ML; G/1000ML; G/1000ML
INJECTION, SOLUTION INTRAVENOUS CONTINUOUS
Status: DISCONTINUED | OUTPATIENT
Start: 2021-09-24 | End: 2021-09-25

## 2021-09-24 RX ORDER — ONDANSETRON 8 MG/1
8 TABLET, ORALLY DISINTEGRATING ORAL EVERY 8 HOURS PRN
Status: DISCONTINUED | OUTPATIENT
Start: 2021-09-24 | End: 2021-09-25 | Stop reason: HOSPADM

## 2021-09-24 RX ORDER — MAGNESIUM SULFATE HEPTAHYDRATE 40 MG/ML
2 INJECTION, SOLUTION INTRAVENOUS ONCE
Status: COMPLETED | OUTPATIENT
Start: 2021-09-24 | End: 2021-09-24

## 2021-09-24 RX ORDER — DIPHENHYDRAMINE HYDROCHLORIDE 50 MG/ML
50 INJECTION INTRAMUSCULAR; INTRAVENOUS ONCE
Status: COMPLETED | OUTPATIENT
Start: 2021-09-24 | End: 2021-09-24

## 2021-09-24 RX ORDER — HYDROMORPHONE HYDROCHLORIDE 1 MG/ML
1 INJECTION, SOLUTION INTRAMUSCULAR; INTRAVENOUS; SUBCUTANEOUS EVERY 4 HOURS PRN
Status: DISCONTINUED | OUTPATIENT
Start: 2021-09-24 | End: 2021-09-25 | Stop reason: HOSPADM

## 2021-09-24 RX ORDER — SCOLOPAMINE TRANSDERMAL SYSTEM 1 MG/1
1 PATCH, EXTENDED RELEASE TRANSDERMAL ONCE
Status: DISCONTINUED | OUTPATIENT
Start: 2021-09-24 | End: 2021-09-25 | Stop reason: HOSPADM

## 2021-09-24 RX ORDER — SODIUM CHLORIDE 9 MG/ML
INJECTION, SOLUTION INTRAVENOUS ONCE
Status: COMPLETED | OUTPATIENT
Start: 2021-09-24 | End: 2021-09-24

## 2021-09-24 RX ORDER — HYDROCODONE BITARTRATE AND ACETAMINOPHEN 5; 325 MG/1; MG/1
1 TABLET ORAL EVERY 4 HOURS PRN
Status: DISCONTINUED | OUTPATIENT
Start: 2021-09-24 | End: 2021-09-25 | Stop reason: HOSPADM

## 2021-09-24 RX ADMIN — LIDOCAINE HYDROCHLORIDE 5 ML: 10 INJECTION, SOLUTION INFILTRATION; PERINEURAL at 10:09

## 2021-09-24 RX ADMIN — MIDAZOLAM HYDROCHLORIDE 1 MG: 1 INJECTION, SOLUTION INTRAMUSCULAR; INTRAVENOUS at 10:09

## 2021-09-24 RX ADMIN — MAGNESIUM SULFATE IN WATER 2 G: 40 INJECTION, SOLUTION INTRAVENOUS at 10:09

## 2021-09-24 RX ADMIN — MIDAZOLAM HYDROCHLORIDE 1 MG: 1 INJECTION, SOLUTION INTRAMUSCULAR; INTRAVENOUS at 11:09

## 2021-09-24 RX ADMIN — PROMETHAZINE HYDROCHLORIDE 12.5 MG: 25 INJECTION INTRAMUSCULAR; INTRAVENOUS at 12:09

## 2021-09-24 RX ADMIN — SODIUM CHLORIDE: 0.9 INJECTION, SOLUTION INTRAVENOUS at 09:09

## 2021-09-24 RX ADMIN — AMPICILLIN SODIUM AND SULBACTAM SODIUM 3 G: 2; 1 INJECTION, POWDER, FOR SOLUTION INTRAMUSCULAR; INTRAVENOUS at 10:09

## 2021-09-24 RX ADMIN — PROMETHAZINE HYDROCHLORIDE 12.5 MG: 25 INJECTION INTRAMUSCULAR; INTRAVENOUS at 11:09

## 2021-09-24 RX ADMIN — PROMETHAZINE HYDROCHLORIDE 12.5 MG: 25 INJECTION INTRAMUSCULAR; INTRAVENOUS at 01:09

## 2021-09-24 RX ADMIN — OCTREOTIDE ACETATE 500 MCG: 500 INJECTION, SOLUTION INTRAVENOUS; SUBCUTANEOUS at 10:09

## 2021-09-24 RX ADMIN — ETHIODIZED OIL 10 ML: 480 INJECTION INTRA-ARTERIAL; INTRALYMPHATIC; INTRAUTERINE at 11:09

## 2021-09-24 RX ADMIN — DEXTROSE, SODIUM CHLORIDE, AND POTASSIUM CHLORIDE: 5; .45; .15 INJECTION INTRAVENOUS at 10:09

## 2021-09-24 RX ADMIN — DIPHENHYDRAMINE HYDROCHLORIDE 50 MG: 50 INJECTION INTRAMUSCULAR; INTRAVENOUS at 10:09

## 2021-09-24 RX ADMIN — SCOPALAMINE 1 PATCH: 1 PATCH, EXTENDED RELEASE TRANSDERMAL at 09:09

## 2021-09-24 RX ADMIN — HEPARIN SODIUM 3000 UNITS/HR: 200 INJECTION, SOLUTION INTRAVENOUS at 10:09

## 2021-09-24 RX ADMIN — FENTANYL CITRATE 50 MCG: 50 INJECTION, SOLUTION INTRAMUSCULAR; INTRAVENOUS at 11:09

## 2021-09-24 RX ADMIN — DEXTROSE, SODIUM CHLORIDE, AND POTASSIUM CHLORIDE: 5; .45; .15 INJECTION INTRAVENOUS at 09:09

## 2021-09-24 RX ADMIN — HYDRALAZINE HYDROCHLORIDE 10 MG: 20 INJECTION INTRAMUSCULAR; INTRAVENOUS at 11:09

## 2021-09-24 RX ADMIN — FENTANYL CITRATE 50 MCG: 50 INJECTION, SOLUTION INTRAMUSCULAR; INTRAVENOUS at 10:09

## 2021-09-24 RX ADMIN — AMPICILLIN SODIUM AND SULBACTAM SODIUM 3 G: 2; 1 INJECTION, POWDER, FOR SOLUTION INTRAMUSCULAR; INTRAVENOUS at 04:09

## 2021-09-24 RX ADMIN — HYDRALAZINE HYDROCHLORIDE 10 MG: 20 INJECTION INTRAMUSCULAR; INTRAVENOUS at 12:09

## 2021-09-24 RX ADMIN — IOHEXOL 50 MG: 350 INJECTION, SOLUTION INTRAVENOUS at 11:09

## 2021-09-24 RX ADMIN — ONDANSETRON 16 MG: 2 INJECTION INTRAMUSCULAR; INTRAVENOUS at 10:09

## 2021-09-24 RX ADMIN — OCTREOTIDE ACETATE 250 MCG/HR: 1000 INJECTION, SOLUTION INTRAVENOUS; SUBCUTANEOUS at 10:09

## 2021-09-24 RX ADMIN — PROCHLORPERAZINE EDISYLATE 10 MG: 5 INJECTION INTRAMUSCULAR; INTRAVENOUS at 01:09

## 2021-09-25 VITALS
TEMPERATURE: 98 F | RESPIRATION RATE: 18 BRPM | OXYGEN SATURATION: 96 % | HEIGHT: 70 IN | DIASTOLIC BLOOD PRESSURE: 82 MMHG | HEART RATE: 74 BPM | WEIGHT: 199.06 LBS | SYSTOLIC BLOOD PRESSURE: 159 MMHG | BODY MASS INDEX: 28.5 KG/M2

## 2021-09-25 LAB
ALBUMIN SERPL BCP-MCNC: 3.4 G/DL (ref 3.5–5.2)
ALP SERPL-CCNC: 71 U/L (ref 55–135)
ALT SERPL W/O P-5'-P-CCNC: 29 U/L (ref 10–44)
ANION GAP SERPL CALC-SCNC: 10 MMOL/L (ref 8–16)
AST SERPL-CCNC: 67 U/L (ref 10–40)
BASOPHILS # BLD AUTO: 0.03 K/UL (ref 0–0.2)
BASOPHILS NFR BLD: 0.4 % (ref 0–1.9)
BILIRUB SERPL-MCNC: 0.9 MG/DL (ref 0.1–1)
BUN SERPL-MCNC: 6 MG/DL (ref 8–23)
CALCIUM SERPL-MCNC: 8.9 MG/DL (ref 8.7–10.5)
CHLORIDE SERPL-SCNC: 107 MMOL/L (ref 95–110)
CO2 SERPL-SCNC: 24 MMOL/L (ref 23–29)
CREAT SERPL-MCNC: 0.8 MG/DL (ref 0.5–1.4)
DIFFERENTIAL METHOD: ABNORMAL
EOSINOPHIL # BLD AUTO: 0 K/UL (ref 0–0.5)
EOSINOPHIL NFR BLD: 0.1 % (ref 0–8)
ERYTHROCYTE [DISTWIDTH] IN BLOOD BY AUTOMATED COUNT: 13.1 % (ref 11.5–14.5)
EST. GFR  (AFRICAN AMERICAN): >60 ML/MIN/1.73 M^2
EST. GFR  (NON AFRICAN AMERICAN): >60 ML/MIN/1.73 M^2
GLUCOSE SERPL-MCNC: 133 MG/DL (ref 70–110)
HCT VFR BLD AUTO: 39.3 % (ref 37–48.5)
HGB BLD-MCNC: 12.4 G/DL (ref 12–16)
IMM GRANULOCYTES # BLD AUTO: 0.03 K/UL (ref 0–0.04)
IMM GRANULOCYTES NFR BLD AUTO: 0.4 % (ref 0–0.5)
LYMPHOCYTES # BLD AUTO: 1.4 K/UL (ref 1–4.8)
LYMPHOCYTES NFR BLD: 16.6 % (ref 18–48)
MCH RBC QN AUTO: 30 PG (ref 27–31)
MCHC RBC AUTO-ENTMCNC: 31.6 G/DL (ref 32–36)
MCV RBC AUTO: 95 FL (ref 82–98)
MONOCYTES # BLD AUTO: 0.7 K/UL (ref 0.3–1)
MONOCYTES NFR BLD: 8 % (ref 4–15)
NEUTROPHILS # BLD AUTO: 6.2 K/UL (ref 1.8–7.7)
NEUTROPHILS NFR BLD: 74.5 % (ref 38–73)
NRBC BLD-RTO: 0 /100 WBC
PLATELET # BLD AUTO: 286 K/UL (ref 150–450)
PMV BLD AUTO: 10.3 FL (ref 9.2–12.9)
POTASSIUM SERPL-SCNC: 4 MMOL/L (ref 3.5–5.1)
PROT SERPL-MCNC: 6 G/DL (ref 6–8.4)
RBC # BLD AUTO: 4.13 M/UL (ref 4–5.4)
SODIUM SERPL-SCNC: 141 MMOL/L (ref 136–145)
WBC # BLD AUTO: 8.25 K/UL (ref 3.9–12.7)

## 2021-09-25 PROCEDURE — 80053 COMPREHEN METABOLIC PANEL: CPT | Performed by: SURGERY

## 2021-09-25 PROCEDURE — 63600175 PHARM REV CODE 636 W HCPCS: Mod: JA | Performed by: RADIOLOGY

## 2021-09-25 PROCEDURE — 85025 COMPLETE CBC W/AUTO DIFF WBC: CPT | Performed by: SURGERY

## 2021-09-25 PROCEDURE — 25000003 PHARM REV CODE 250: Performed by: RADIOLOGY

## 2021-09-25 PROCEDURE — 25000003 PHARM REV CODE 250: Performed by: SURGERY

## 2021-09-25 PROCEDURE — 63600175 PHARM REV CODE 636 W HCPCS: Performed by: SURGERY

## 2021-09-25 PROCEDURE — 36415 COLL VENOUS BLD VENIPUNCTURE: CPT | Performed by: SURGERY

## 2021-09-25 PROCEDURE — 25000003 PHARM REV CODE 250: Performed by: STUDENT IN AN ORGANIZED HEALTH CARE EDUCATION/TRAINING PROGRAM

## 2021-09-25 RX ORDER — TRAMADOL HYDROCHLORIDE 50 MG/1
50 TABLET ORAL EVERY 6 HOURS PRN
Qty: 15 TABLET | Refills: 0 | Status: SHIPPED | OUTPATIENT
Start: 2021-09-25 | End: 2021-11-22

## 2021-09-25 RX ORDER — ONDANSETRON 4 MG/1
4 TABLET, ORALLY DISINTEGRATING ORAL EVERY 6 HOURS PRN
Qty: 30 TABLET | Refills: 0 | Status: SHIPPED | OUTPATIENT
Start: 2021-09-25 | End: 2021-11-22

## 2021-09-25 RX ORDER — LISINOPRIL 20 MG/1
40 TABLET ORAL DAILY
Status: DISCONTINUED | OUTPATIENT
Start: 2021-09-25 | End: 2021-09-25 | Stop reason: HOSPADM

## 2021-09-25 RX ORDER — CIPROFLOXACIN 500 MG/1
500 TABLET ORAL EVERY 12 HOURS
Qty: 10 TABLET | Refills: 0 | Status: SHIPPED | OUTPATIENT
Start: 2021-09-25 | End: 2021-09-30

## 2021-09-25 RX ADMIN — AMPICILLIN SODIUM AND SULBACTAM SODIUM 3 G: 2; 1 INJECTION, POWDER, FOR SOLUTION INTRAMUSCULAR; INTRAVENOUS at 10:09

## 2021-09-25 RX ADMIN — OCTREOTIDE ACETATE 250 MCG/HR: 1000 INJECTION, SOLUTION INTRAVENOUS; SUBCUTANEOUS at 05:09

## 2021-09-25 RX ADMIN — LISINOPRIL 40 MG: 20 TABLET ORAL at 10:09

## 2021-09-25 RX ADMIN — AMPICILLIN SODIUM AND SULBACTAM SODIUM 3 G: 2; 1 INJECTION, POWDER, FOR SOLUTION INTRAMUSCULAR; INTRAVENOUS at 05:09

## 2021-09-27 ENCOUNTER — TELEPHONE (OUTPATIENT)
Dept: NEUROLOGY | Facility: HOSPITAL | Age: 71
End: 2021-09-27

## 2021-10-01 ENCOUNTER — OFFICE VISIT (OUTPATIENT)
Dept: NEUROLOGY | Facility: HOSPITAL | Age: 71
End: 2021-10-01
Attending: SURGERY
Payer: MEDICARE

## 2021-10-01 DIAGNOSIS — E34.0 CARCINOID SYNDROME: ICD-10-CM

## 2021-10-01 DIAGNOSIS — C7B.8 SECONDARY NEUROENDOCRINE TUMOR OF LIVER: ICD-10-CM

## 2021-10-01 DIAGNOSIS — T81.718D POST-EMBOLIZATION SYNDROME FOLLOWING CHEMOEMBOLIZATION OF LIVER, SUBSEQUENT ENCOUNTER: ICD-10-CM

## 2021-10-01 DIAGNOSIS — C7A.095 MALIGNANT CARCINOID TUMOR OF MIDGUT: Primary | ICD-10-CM

## 2021-10-07 ENCOUNTER — PATIENT MESSAGE (OUTPATIENT)
Dept: NEUROLOGY | Facility: HOSPITAL | Age: 71
End: 2021-10-07

## 2021-10-18 ENCOUNTER — PATIENT MESSAGE (OUTPATIENT)
Dept: NEUROLOGY | Facility: HOSPITAL | Age: 71
End: 2021-10-18

## 2021-10-19 ENCOUNTER — PATIENT MESSAGE (OUTPATIENT)
Dept: NEUROLOGY | Facility: HOSPITAL | Age: 71
End: 2021-10-19
Payer: COMMERCIAL

## 2021-10-21 ENCOUNTER — TELEPHONE (OUTPATIENT)
Dept: NEUROLOGY | Facility: HOSPITAL | Age: 71
End: 2021-10-21
Payer: COMMERCIAL

## 2021-11-03 ENCOUNTER — PATIENT MESSAGE (OUTPATIENT)
Dept: NEUROLOGY | Facility: HOSPITAL | Age: 71
End: 2021-11-03
Payer: COMMERCIAL

## 2021-11-22 ENCOUNTER — OFFICE VISIT (OUTPATIENT)
Dept: NEUROLOGY | Facility: HOSPITAL | Age: 71
End: 2021-11-22
Attending: SURGERY
Payer: MEDICARE

## 2021-11-22 VITALS
TEMPERATURE: 99 F | HEIGHT: 70 IN | HEART RATE: 88 BPM | DIASTOLIC BLOOD PRESSURE: 99 MMHG | SYSTOLIC BLOOD PRESSURE: 165 MMHG | BODY MASS INDEX: 27.81 KG/M2 | WEIGHT: 194.25 LBS

## 2021-11-22 DIAGNOSIS — E34.0 CARCINOID SYNDROME: ICD-10-CM

## 2021-11-22 DIAGNOSIS — C7B.8 SECONDARY NEUROENDOCRINE TUMOR OF LIVER: ICD-10-CM

## 2021-11-22 DIAGNOSIS — C7A.095 MALIGNANT CARCINOID TUMOR OF MIDGUT: ICD-10-CM

## 2021-11-22 DIAGNOSIS — T81.718D POST-EMBOLIZATION SYNDROME FOLLOWING CHEMOEMBOLIZATION OF LIVER, SUBSEQUENT ENCOUNTER: Primary | ICD-10-CM

## 2021-11-22 PROCEDURE — 99213 OFFICE O/P EST LOW 20 MIN: CPT | Performed by: SURGERY

## 2021-11-22 RX ORDER — OCTREOTIDE ACETATE 50 UG/ML
50 INJECTION, SOLUTION INTRAVENOUS; SUBCUTANEOUS EVERY 8 HOURS
Qty: 90 ML | Refills: 0 | Status: SHIPPED | OUTPATIENT
Start: 2021-11-22 | End: 2022-01-03 | Stop reason: SDUPTHER

## 2021-11-22 RX ORDER — LANREOTIDE ACETATE 120 MG/.5ML
120 INJECTION SUBCUTANEOUS
COMMUNITY

## 2021-11-26 ENCOUNTER — SPECIALTY PHARMACY (OUTPATIENT)
Dept: PHARMACY | Facility: CLINIC | Age: 71
End: 2021-11-26
Payer: COMMERCIAL

## 2021-11-30 ENCOUNTER — PATIENT MESSAGE (OUTPATIENT)
Dept: NEUROLOGY | Facility: HOSPITAL | Age: 71
End: 2021-11-30
Payer: COMMERCIAL

## 2021-11-30 ENCOUNTER — CONFERENCE (OUTPATIENT)
Dept: NEUROLOGY | Facility: HOSPITAL | Age: 71
End: 2021-11-30
Payer: COMMERCIAL

## 2021-12-06 ENCOUNTER — PATIENT MESSAGE (OUTPATIENT)
Dept: RESEARCH | Facility: HOSPITAL | Age: 71
End: 2021-12-06
Payer: COMMERCIAL

## 2021-12-23 NOTE — TELEPHONE ENCOUNTER
Therapy appropriate.   PA submitted via [facility] Key: [id number] - PA Case ID: [id number]./ Determination pending.

## 2021-12-23 NOTE — TELEPHONE ENCOUNTER
Octreotide PA approved through 2022. Copay is $47.00  Current script is . I will reach out to provider's office to confirm it is okay to reactivate. Awaiting response. Once confirmed I will forward to  for review.

## 2022-01-03 ENCOUNTER — SPECIALTY PHARMACY (OUTPATIENT)
Dept: PHARMACY | Facility: CLINIC | Age: 72
End: 2022-01-03
Payer: COMMERCIAL

## 2022-01-03 NOTE — TELEPHONE ENCOUNTER
Outgoing call to pt. She is agreeable to pay $47 copayment. I will not proceed with initial consultation.

## 2022-01-03 NOTE — TELEPHONE ENCOUNTER
Specialty Pharmacy - Initial Clinical Assessment    Specialty Medication Orders Linked to Encounter    Flowsheet Row Most Recent Value   Medication #1 octreotide (SANDOSTATIN) 50 mcg/mL Soln (Order#[id number], Rx#[id number])        Kat Colon is a 71 y.o. female, who is followed by the specialty pharmacy service for management and education.    Recent Encounters     Date Type Provider Description    01/03/2022 Specialty Pharmacy MELODIE MORELAND PharmD Initial Clinical Assessment    11/26/2021 Specialty Pharmacy MELODIE MORELAND PharmD Referral Authorization        Clinical call attempts since last clinical assessment   No call attempts found.     Today she received education before her first fill with [facility].    Current Outpatient Medications   Medication Sig    lanreotide (SOMATULINE DEPOT) 120 mg/0.5 mL Syrg Inject 120 mg into the skin.    lisinopriL (PRINIVIL,ZESTRIL) 40 MG tablet Take 40 mg by mouth once daily.    octreotide (SANDOSTATIN) 50 mcg/mL Soln Inject 1 mL ( 50 mcg) into the skin every 8 hours   Last reviewed on 11/22/2021  1:55 PM by Kia Gross MA    Review of patient's allergies indicates:   Allergen Reactions    Epinephrine Other (See Comments)     Net patient   Last reviewed on  1/3/2022 8:54 AM by Melodie Moreland        Adverse Effects    Flushing: Pos  *All other systems reviewed and are negative       Assessment Questions - Documented Responses    Flowsheet Row Most Recent Value   Assessment    Medication Reconciliation completed for patient Yes   During the past 4 weeks, has patient missed any activities due to condition or medication? No   During the past 4 weeks, did patient have any of the following urgent care visits? None   Goals of Therapy Status Discussed (new start)   Welcome packet contents reviewed and discussed with patient? Yes   Assesment completed? Yes   Plan Therapy being initiated   Do you need to open a clinical intervention (i-vent)?  "No   Do you want to schedule first shipment? Yes   Medication #1 Assessment Info    Patient status New medication, New to [facility]   Is this medication appropriate for the patient? Yes   Is this medication effective? Not yet started        Refill Questions - Documented Responses    Flowsheet Row Most Recent Value   Patient Availability and HIPAA Verification    Does patient want to proceed with activity? Yes   HIPAA/medical authority confirmed? Yes   Relationship to patient of person spoken to? Self   Refill Screening Questions    When does the patient need to receive the medication? 01/06/22   Refill Delivery Questions    How will the patient receive the medication? Mail   When does the patient need to receive the medication? 01/06/22   Shipping Address Home   Address in [facility] confirmed and updated if neccessary? Yes   Expected Copay ($) 0   Is the patient able to afford the medication copay? Yes   Payment Method CC on file   Days supply of Refill 30   Supplies needed? No supplies needed   Refill activity completed? Yes   Refill activity plan Refill scheduled   Shipment/Pickup Date: 01/04/22          Objective    She has a past medical history of Colon cancer metastasized to liver (2004), Hypertension, and Kidney calculus (02/11/2021).    Tried/failed medications: Lanreotide    BP Readings from Last 4 Encounters:   11/22/21 (!) 165/99   09/25/21 (!) 159/82     Ht Readings from Last 4 Encounters:   11/22/21 5' 10" (1.778 m)   09/24/21 5' 10" (1.778 m)     Wt Readings from Last 4 Encounters:   11/22/21 88.1 kg (194 lb 3.6 oz)   09/24/21 90.3 kg (199 lb 1.2 oz)     No results for input(s): RBC, HGB, HCT, WBC, GRAN, PLT, NA, K, CL, GLU, BUN, CREATININE, CALCIUM, PROT, ALBUMIN, BILITOT, ALKPHOS, AST, ALT in the last 2160 hours.  The goals of cancer treatment include:  · Achieving remission of cancer, if possible  · Reducing tumor size and spread of cancer, if remission is not possible  · Minimizing pain and " "symptoms of the cancer  · Preventing infection and other complications of treatment  · Promoting adequate nutrition  · Encouraging proper hydration  · Improving or maintaining quality of life  · Maintaining optimal therapy adherence  · Minimizing and managing side effects    Goals of Therapy Status: Discussed (new start)    Assessment/Plan  Patient plans to start therapy on 01/06/22      Indication, dosage, appropriateness, effectiveness, safety and convenience of her specialty medication(s) were reviewed today.     Patient Counseling    Counseled the patient on the following: doses and administration discussed, safe handling, storage, and disposal discussed, possible adverse effects and management discussed, possible drug and prescription drug interactions discussed, possible drug and OTC drug and food interactions discussed, lab monitoring and follow-up discussed, therapeutic rationale discussed, cost of medications and cost implications discussed, adherence and missed doses discussed, pharmacy contact information discussed       Initial Octreotide 50 mcg/mL solution consult completed on 01/03/2022. Octreotide will be shipped on 01/03/2022 to arrive at patient's home on 01/05/2022 via [employer]. $47.00 copay. Patient will start Octreotide on  01/06/2022.     Patient understands Octreotide has been prescribed to treat breakthrough NET symptoms not managed by Lanreotide injections. She does not have refills on this medication and it is being prescribed as needed, therefore, enrollment will be changed to "dispense only".     --Injection experience: Patient does not have any injection experience.     Counseled patient on administration directions:  - Inject 1 mL (50 mcg) into the skin every 8 hours.   · Wash hands before and after injection.  ·  RX will come with gauze, bandaids, and alcohol swabs.  · You may administer subQ into the abdomen (2 inches away from belly button), front of the middle thigh, back/outer area of " the upper arms  · Rotate injection sites  · Solution should be clear and free of particles  · Remove vial from refrigerator and allow it 30 mins to reach room temperature   · Do not place in microwave to warm  · Do not inject in areas that are tender, red, or bruised. Also, avoid injecting into stretch marks, moles, birthmarks.  · Prepare syringe: Remove cap from MDV and wipe rubber stopper with alcohol.  Insert needle into vial, withdraw medication, and remove any air bubbles.  · Patient is to wipe down the injection site with the alcohol pad, wait to dry.  Pinch skin and inject needle at 45-90 degree angle.  Slowly push plunger and inject entire amount of  medication.  · Patient should rotate injection sites.     Disposal:   Use a sharps container to discard used needles.   Seal with duct tape and label the sharps container when it is ¾ full and throw in a plastic trash bag (LA Actions)  Check with your pharmacist if you have questions about the safe disposal of this medication    Storage  · Store in original vials until ready to administer  · Store in refrigerator  · Protect from light  · If you forget to refrigerate, may use for up to 14 days if kept at room temperature   · MDV can be discarded 14 days after first opening if left at room temperature  · Keep all drugs in a safe place. Keep all drugs out of the reach of children and pets.    Patient was counseled on possible side effects:   abdominal pain, diarrhea, flatulence, injection site pain, dizziness, muscle or joint pain, back pain, and fatigue.    Contact MD - if any signs of allergic reaction, changes in blood pressure, and blood sugar, and low thyroid .    DDIs:  Medication list reviewed, no interactions exist.   Patient understands to report any medication changes to [facility] and provider    Patient confirmed understanding. Patient did not have additional questions.  Patient plans to start Octreotide 50 mcg/mL on 01/06/2022. Octreotide will be taken as needed  for breakthrough symptoms. Therefore, pt will contact [facility] for refills. Encounter will be enrolled as dispense only.     Tasks added this encounter   No tasks added.   Tasks due within next 3 months   1/3/2022 - Clinical - Initial Assessment     Asha HANNAD  [facility]  [address]  [facility] [address]  Phone: [phone]  Fax: [phone]

## 2022-01-03 NOTE — TELEPHONE ENCOUNTER
Octreotide reordered via phone. Received inbasket message from Ghazal Galvan RN with permission to reorder the script with same instructions and qty.

## 2022-03-04 ENCOUNTER — PATIENT MESSAGE (OUTPATIENT)
Dept: NEUROLOGY | Facility: HOSPITAL | Age: 72
End: 2022-03-04
Payer: COMMERCIAL

## 2022-03-31 ENCOUNTER — TELEPHONE (OUTPATIENT)
Dept: NEUROLOGY | Facility: HOSPITAL | Age: 72
End: 2022-03-31
Payer: COMMERCIAL

## 2022-03-31 NOTE — TELEPHONE ENCOUNTER
----- Message from Evelia Gimenez sent at 3/30/2022  4:21 PM CDT -----  Contact: Dr. Jain [phone]  MM    Type: Requesting to speak with nurse / MD     Who Called: Dr. Jain   Regarding: wants to notify that pt's serotonin level is rising and she wants to push up pt's MRI    Would the patient rather a call back or a response via [facility]? Call back  Best Call Back Number: [phone]  Additional Information: n/a

## 2022-03-31 NOTE — TELEPHONE ENCOUNTER
Returned call.  Left message for Loni to return call on voice mail.  Also provided Dr. Carbone physician line to her for Dr. Jain to contact her.  This message forwarded to Dr. Lincoln to call Dr. Jain.

## 2022-03-31 NOTE — TELEPHONE ENCOUNTER
----- Message from Evelia Gimenez sent at 3/30/2022  2:04 PM CDT -----  Contact: Loni hampton/ Dr. Jain's Office [phone]  MM    Type: Requesting to speak with nurse    Who Called: Loni   Regarding: Dr. Jain would like to speak with Dr. Lincoln to discuss mutual pt    Would the patient rather a call back or a response via [facility]? Call back  Best Call Back Number:  [phone]   Additional Information: Dr. Alan Jain Cell [phone]

## 2022-04-12 ENCOUNTER — TELEPHONE (OUTPATIENT)
Dept: NEUROLOGY | Facility: HOSPITAL | Age: 72
End: 2022-04-12
Payer: COMMERCIAL

## 2022-04-12 LAB
EXT 24 HR UR METANEPHRINE: ABNORMAL
EXT 24 HR UR METANEPHRINE: ABNORMAL
EXT 24 HR UR NORMETANEPHRINE: ABNORMAL
EXT 25 HYDROXY VIT D2: ABNORMAL
EXT 25 HYDROXY VIT D2: ABNORMAL
EXT 25 HYDROXY VIT D3: ABNORMAL
EXT 25 HYDROXY VIT D3: ABNORMAL
EXT 5 HIAA 24 HR URINE: ABNORMAL
EXT 5 HIAA 24 HR URINE: ABNORMAL
EXT 5 HIAA BLOOD: ABNORMAL
EXT 5 HIAA BLOOD: ABNORMAL
EXT ACTH: ABNORMAL
EXT ACTH: ABNORMAL
EXT AFP: ABNORMAL
EXT AFP: ABNORMAL
EXT ALBUMIN: 4.5 G/DL (ref 3.8–4.8)
EXT ALBUMIN: 4.5 G/DL (ref 3.8–4.8)
EXT ALKALINE PHOSPHATASE: 107 IU/L (ref 44–121)
EXT ALKALINE PHOSPHATASE: 107 IU/L (ref 44–121)
EXT ALT: 14 IU/L (ref 0–32)
EXT ALT: 14 IU/L (ref 0–32)
EXT AMYLASE: ABNORMAL
EXT AMYLASE: ABNORMAL
EXT ANTI ISLET CELL AB: ABNORMAL
EXT ANTI ISLET CELL AB: ABNORMAL
EXT ANTI PARIETAL CELL AB: ABNORMAL
EXT ANTI PARIETAL CELL AB: ABNORMAL
EXT ANTI THYROID AB: ABNORMAL
EXT ANTI THYROID AB: ABNORMAL
EXT AST: 17 IU/L (ref 0–40)
EXT AST: 17 IU/L (ref 0–40)
EXT BILIRUBIN DIRECT: ABNORMAL
EXT BILIRUBIN DIRECT: ABNORMAL
EXT BILIRUBIN TOTAL: 0.6 MG/DL (ref 0–1.2)
EXT BILIRUBIN TOTAL: 0.6 MG/DL (ref 0–1.2)
EXT BK VIRUS DNA QN PCR: ABNORMAL
EXT BK VIRUS DNA QN PCR: ABNORMAL
EXT BUN: 11 MG/DL (ref 8–27)
EXT BUN: 11 MG/DL (ref 8–27)
EXT C PEPTIDE: ABNORMAL
EXT C PEPTIDE: ABNORMAL
EXT CA 125: ABNORMAL
EXT CA 125: ABNORMAL
EXT CA 19-9: ABNORMAL
EXT CA 19-9: ABNORMAL
EXT CA 27-29: ABNORMAL
EXT CA 27-29: ABNORMAL
EXT CALCITONIN: ABNORMAL
EXT CALCITONIN: ABNORMAL
EXT CALCIUM: 9.7 MG/DL (ref 8.7–10.3)
EXT CALCIUM: 9.7 MG/DL (ref 8.7–10.3)
EXT CEA: ABNORMAL
EXT CEA: ABNORMAL
EXT CHLORIDE: 103 MMOL/L (ref 96–106)
EXT CHLORIDE: 103 MMOL/L (ref 96–106)
EXT CHOLESTEROL: ABNORMAL
EXT CHOLESTEROL: ABNORMAL
EXT CHROMOGRANIN A: ABNORMAL
EXT CHROMOGRANIN A: ABNORMAL
EXT CO2: 23 MMOL/L (ref 20–29)
EXT CO2: 23 MMOL/L (ref 20–29)
EXT CREATININE UA: ABNORMAL
EXT CREATININE UA: ABNORMAL
EXT CREATININE: 0.75 MG/DL (ref 0.57–1)
EXT CREATININE: 0.75 MG/DL (ref 0.57–1)
EXT CYCLOSPORONE LEVEL: ABNORMAL
EXT CYCLOSPORONE LEVEL: ABNORMAL
EXT DOPAMINE: ABNORMAL
EXT DOPAMINE: ABNORMAL
EXT EBV DNA BY PCR: ABNORMAL
EXT EBV DNA BY PCR: ABNORMAL
EXT EPINEPHRINE: ABNORMAL
EXT EPINEPHRINE: ABNORMAL
EXT FOLATE: ABNORMAL
EXT FOLATE: ABNORMAL
EXT FREE T3: ABNORMAL
EXT FREE T3: ABNORMAL
EXT FREE T4: ABNORMAL
EXT FREE T4: ABNORMAL
EXT FSH: ABNORMAL
EXT FSH: ABNORMAL
EXT GASTRIN RELEASING PEPTIDE: ABNORMAL
EXT GASTRIN: ABNORMAL
EXT GASTRIN: ABNORMAL
EXT GGT: ABNORMAL
EXT GGT: ABNORMAL
EXT GHRELIN: ABNORMAL
EXT GHRELIN: ABNORMAL
EXT GLUCAGON: ABNORMAL
EXT GLUCAGON: ABNORMAL
EXT GLUCOSE: 96 MG/DL (ref 65–99)
EXT GLUCOSE: 96 MG/DL (ref 65–99)
EXT GROWTH HORMONE: ABNORMAL
EXT GROWTH HORMONE: ABNORMAL
EXT HCV RNA QUANT PCR: ABNORMAL
EXT HCV RNA QUANT PCR: ABNORMAL
EXT HDL: ABNORMAL
EXT HDL: ABNORMAL
EXT HEMATOCRIT: ABNORMAL
EXT HEMATOCRIT: ABNORMAL
EXT HEMOGLOBIN A1C: ABNORMAL
EXT HEMOGLOBIN A1C: ABNORMAL
EXT HEMOGLOBIN: ABNORMAL
EXT HEMOGLOBIN: ABNORMAL
EXT HISTAMINE 24 HR URINE: ABNORMAL
EXT HISTAMINE 24 HR URINE: ABNORMAL
EXT HISTAMINE: ABNORMAL
EXT HISTAMINE: ABNORMAL
EXT IGF-1: ABNORMAL
EXT IGF-1: ABNORMAL
EXT IMMUNKNOW (NON-STIMULATED): ABNORMAL
EXT IMMUNKNOW (NON-STIMULATED): ABNORMAL
EXT IMMUNKNOW (STIMULATED): ABNORMAL
EXT IMMUNKNOW (STIMULATED): ABNORMAL
EXT INR: ABNORMAL
EXT INR: ABNORMAL
EXT INSULIN: ABNORMAL
EXT INSULIN: ABNORMAL
EXT LANREOTIDE LEVEL: ABNORMAL
EXT LANREOTIDE LEVEL: ABNORMAL
EXT LDH, TOTAL: ABNORMAL
EXT LDH, TOTAL: ABNORMAL
EXT LDL CHOLESTEROL: ABNORMAL
EXT LDL CHOLESTEROL: ABNORMAL
EXT LIPASE: ABNORMAL
EXT LIPASE: ABNORMAL
EXT MAGNESIUM: ABNORMAL
EXT MAGNESIUM: ABNORMAL
EXT METANEPHRINE FREE PLASMA: ABNORMAL
EXT METANEPHRINE FREE PLASMA: ABNORMAL
EXT MOTILIN: ABNORMAL
EXT MOTILIN: ABNORMAL
EXT NEUROKININ A CAMB: ABNORMAL
EXT NEUROKININ A CAMB: ABNORMAL
EXT NEUROKININ A ISI: ABNORMAL
EXT NEUROKININ A ISI: ABNORMAL
EXT NEUROTENSIN: ABNORMAL
EXT NEUROTENSIN: ABNORMAL
EXT NOREPINEPHRINE: ABNORMAL
EXT NOREPINEPHRINE: ABNORMAL
EXT NORMETANEPHRINE: ABNORMAL
EXT NORMETANEPHRINE: ABNORMAL
EXT NSE: ABNORMAL
EXT NSE: ABNORMAL
EXT OCTREOTIDE LEVEL: ABNORMAL
EXT OCTREOTIDE LEVEL: ABNORMAL
EXT PANCREASTATIN CAMB: ABNORMAL
EXT PANCREASTATIN CAMB: ABNORMAL
EXT PANCREASTATIN ISI: 768 PG/ML (ref 10–135)
EXT PANCREASTATIN ISI: ABNORMAL
EXT PANCREATIC POLYPEPTIDE: ABNORMAL
EXT PANCREATIC POLYPEPTIDE: ABNORMAL
EXT PHOSPHORUS: ABNORMAL
EXT PHOSPHORUS: ABNORMAL
EXT PLATELETS: ABNORMAL
EXT PLATELETS: ABNORMAL
EXT POTASSIUM: 4.8 MMOL/L (ref 3.5–5.2)
EXT POTASSIUM: 4.8 MMOL/L (ref 3.5–5.2)
EXT PROGRAF LEVEL: ABNORMAL
EXT PROGRAF LEVEL: ABNORMAL
EXT PROLACTIN: ABNORMAL
EXT PROLACTIN: ABNORMAL
EXT PROTEIN TOTAL: 6.7 G/DL (ref 6–8.5)
EXT PROTEIN TOTAL: 6.7 G/DL (ref 6–8.5)
EXT PROTEIN UA: ABNORMAL
EXT PROTEIN UA: ABNORMAL
EXT PT: ABNORMAL
EXT PT: ABNORMAL
EXT PTH, INTACT: ABNORMAL
EXT PTH, INTACT: ABNORMAL
EXT PTT: ABNORMAL
EXT PTT: ABNORMAL
EXT RAPAMUNE LEVEL: ABNORMAL
EXT RAPAMUNE LEVEL: ABNORMAL
EXT SEROTONIN: 1446 NG/ML (ref 0–420)
EXT SEROTONIN: 1446 NG/ML (ref 0–420)
EXT SODIUM: 142 MMOL/L (ref 134–144)
EXT SODIUM: 142 MMOL/L (ref 134–144)
EXT SOMATOSTATIN: ABNORMAL
EXT SOMATOSTATIN: ABNORMAL
EXT SUBSTANCE P: ABNORMAL
EXT SUBSTANCE P: ABNORMAL
EXT TRIGLYCERIDES: ABNORMAL
EXT TRIGLYCERIDES: ABNORMAL
EXT TRYPTASE: ABNORMAL
EXT TRYPTASE: ABNORMAL
EXT TSH: ABNORMAL
EXT TSH: ABNORMAL
EXT URIC ACID: ABNORMAL
EXT URIC ACID: ABNORMAL
EXT URINE AMYLASE U/HR: ABNORMAL
EXT URINE AMYLASE U/HR: ABNORMAL
EXT URINE AMYLASE U/L: ABNORMAL
EXT URINE AMYLASE U/L: ABNORMAL
EXT VASOACTIVE INTESTINAL POLYPEPTIDE: ABNORMAL
EXT VASOACTIVE INTESTINAL POLYPEPTIDE: ABNORMAL
EXT VITAMIN B12: ABNORMAL
EXT VITAMIN B12: ABNORMAL
EXT VMA 24 HR URINE: ABNORMAL
EXT VMA 24 HR URINE: ABNORMAL
EXT WBC: ABNORMAL
EXT WBC: ABNORMAL
GLOBULIN: 2.2 G/DL (ref 1.5–4.5)
NEURON SPECIFIC ENOLASE: ABNORMAL
NEURON SPECIFIC ENOLASE: ABNORMAL

## 2022-04-12 NOTE — TELEPHONE ENCOUNTER
----- Message from Evelia Gimenez sent at 4/11/2022  9:39 AM CDT -----  Contact: Osiris hampton/ [facility]  MM    Type: Requesting to speak with nurse    Who Called: Osiris   Regarding: discuss prior auth for MRI    Would the patient rather a call back or a response via [email or web address]? Call back  Best Call Back Number: [phone]  Additional Information: n/a

## 2022-04-12 NOTE — TELEPHONE ENCOUNTER
----- Message from Evelia Donald sent at 4/12/2022 10:38 AM CDT -----  Contact: [phone]  Type:  Needs Medical Advice    Who Called:[email or web address]  Would the patient rather a call back or a response via [facility]? Call back  Best Call Back Number:[phone]  Additional Information: Calling to check on the status of MRI authorization. Pt appt is tomorrow at 9:30am, Please call to advice

## 2022-04-12 NOTE — TELEPHONE ENCOUNTER
Returned call. Spoke with Ivy.  They are requesting auth for MRI tomorrow.    MRI- CPT 49473  [facility] - NPI- [id number], Tax ID #- [id number]  Scheduled 4/13- at 9:30 am    Send auth to [phone]  or [phone] fax.    Sent to [employer] for assistance.

## 2022-04-12 NOTE — TELEPHONE ENCOUNTER
----- Message from Miesha Carlos LPN sent at 4/11/2022  4:15 PM CDT -----  Contact: Osiris hampton/ [facility]    ----- Message -----  From: Evelia Gimenez  Sent: 4/11/2022   9:41 AM CDT  To: Salazar Almeida        Type: Requesting to speak with nurse    Who Called: Osiris   Regarding: discuss prior auth for MRI    Would the patient rather a call back or a response via [facility]? Call back  Best Call Back Number: [phone]  Additional Information: n/a

## 2022-04-12 NOTE — TELEPHONE ENCOUNTER
This is pending with the insurance company and Ivy wilson [facility] has been notified they stated they would reach out to the patient to reschedule. I will fax the authorization once it is obtained.

## 2022-04-18 ENCOUNTER — TELEPHONE (OUTPATIENT)
Dept: NEUROLOGY | Facility: HOSPITAL | Age: 72
End: 2022-04-18
Payer: COMMERCIAL

## 2022-04-18 NOTE — TELEPHONE ENCOUNTER
----- Message from Mana Lincoln MD sent at 4/16/2022  4:57 PM CDT -----  Contact: Loni hampton/ Dr. Jain's Office [phone]  I have called several times, most recently I left my cell number.   ----- Message -----  From: Sylwia Guadarrama RN  Sent: 3/31/2022  10:10 AM CDT  To: Mana Lincoln MD    Please call DR. Jain--cell # below.    Pt is Tea Colon MRN-   [id number]    I called the office and provided them your provider line as well.  \    ----- Message -----  From: Evelia Gimenez  Sent: 3/30/2022   2:27 PM CDT  To: Salazar Almeida        Type: Requesting to speak with nurse    Who Called: Loni   Regarding: Dr. Jain would like to speak with Dr. Lincoln to discuss mutual pt    Would the patient rather a call back or a response via [facility]? Call back  Best Call Back Number:  [phone]   Additional Information: Dr. Alan Jain Cell [phone]

## 2022-04-20 ENCOUNTER — TELEPHONE (OUTPATIENT)
Dept: NEUROLOGY | Facility: HOSPITAL | Age: 72
End: 2022-04-20
Payer: COMMERCIAL

## 2022-04-20 NOTE — TELEPHONE ENCOUNTER
[facility] requesting mri orders, scheduled today at 530pm.  Orders faxed to [phone].

## 2022-04-20 NOTE — TELEPHONE ENCOUNTER
----- Message from Evelia Gimenez sent at 4/20/2022  3:03 PM CDT -----  Contact: [phone]/Alivia hampton/ [facility]  Type: Requesting to speak with nurse    Who Called: Alivia  Regarding: Has questions on pts scan appt  Would the patient rather a call back or a response via [facility]? Call back  Best Call Back Number: [phone]  Additional Information: pt scheduled today at 530p

## 2022-04-22 ENCOUNTER — PATIENT MESSAGE (OUTPATIENT)
Dept: NEUROLOGY | Facility: HOSPITAL | Age: 72
End: 2022-04-22
Payer: COMMERCIAL

## 2022-05-02 LAB
EXT 24 HR UR METANEPHRINE: ABNORMAL
EXT 24 HR UR NORMETANEPHRINE: ABNORMAL
EXT 24 HR UR NORMETANEPHRINE: ABNORMAL
EXT 25 HYDROXY VIT D2: ABNORMAL
EXT 25 HYDROXY VIT D3: ABNORMAL
EXT 5 HIAA 24 HR URINE: ABNORMAL
EXT 5 HIAA BLOOD: ABNORMAL
EXT ACTH: ABNORMAL
EXT AFP: ABNORMAL
EXT ALBUMIN: 2.8 G/DL (ref 3.5–5)
EXT ALKALINE PHOSPHATASE: 790 U/L (ref 45–117)
EXT ALT: 60 U/L (ref 5–65)
EXT AMYLASE: ABNORMAL
EXT ANTI ISLET CELL AB: ABNORMAL
EXT ANTI PARIETAL CELL AB: ABNORMAL
EXT ANTI THYROID AB: ABNORMAL
EXT AST: 41 U/L (ref 5–40)
EXT BILIRUBIN DIRECT: ABNORMAL
EXT BILIRUBIN TOTAL: 0.3 MG/DL (ref 0–1.2)
EXT BK VIRUS DNA QN PCR: ABNORMAL
EXT BUN: 16 MG/DL (ref 6–23)
EXT C PEPTIDE: ABNORMAL
EXT CA 125: ABNORMAL
EXT CA 19-9: ABNORMAL
EXT CA 27-29: ABNORMAL
EXT CALCITONIN: ABNORMAL
EXT CALCIUM: 8.8 MG/DL (ref 8.5–10.4)
EXT CEA: ABNORMAL
EXT CHLORIDE: 109 MMOL/L (ref 96–112)
EXT CHOLESTEROL: ABNORMAL
EXT CHROMOGRANIN A: ABNORMAL
EXT CO2: 27 MMOL/L (ref 24–34)
EXT CREATININE UA: ABNORMAL
EXT CREATININE: 0.8 MG/DL (ref 0.55–1.02)
EXT CYCLOSPORONE LEVEL: ABNORMAL
EXT DOPAMINE: ABNORMAL
EXT EBV DNA BY PCR: ABNORMAL
EXT EPINEPHRINE: ABNORMAL
EXT FOLATE: ABNORMAL
EXT FREE T3: ABNORMAL
EXT FREE T4: ABNORMAL
EXT FSH: ABNORMAL
EXT GASTRIN RELEASING PEPTIDE: ABNORMAL
EXT GASTRIN RELEASING PEPTIDE: ABNORMAL
EXT GASTRIN: ABNORMAL
EXT GGT: ABNORMAL
EXT GHRELIN: ABNORMAL
EXT GLUCAGON: ABNORMAL
EXT GLUCOSE: 110 MG/DL (ref 60–99)
EXT GROWTH HORMONE: ABNORMAL
EXT HCV RNA QUANT PCR: ABNORMAL
EXT HDL: ABNORMAL
EXT HEMATOCRIT: 29.4 % (ref 36–48)
EXT HEMOGLOBIN A1C: ABNORMAL
EXT HEMOGLOBIN: 9.3 G/DL (ref 12–16)
EXT HISTAMINE 24 HR URINE: ABNORMAL
EXT HISTAMINE: ABNORMAL
EXT IGF-1: ABNORMAL
EXT IMMUNKNOW (NON-STIMULATED): ABNORMAL
EXT IMMUNKNOW (STIMULATED): ABNORMAL
EXT INR: 1.3 (ref 0.9–1.1)
EXT INSULIN: ABNORMAL
EXT LANREOTIDE LEVEL: ABNORMAL
EXT LDH, TOTAL: ABNORMAL
EXT LDL CHOLESTEROL: ABNORMAL
EXT LIPASE: ABNORMAL
EXT MAGNESIUM: ABNORMAL
EXT METANEPHRINE FREE PLASMA: ABNORMAL
EXT MOTILIN: ABNORMAL
EXT NEUROKININ A CAMB: ABNORMAL
EXT NEUROKININ A ISI: ABNORMAL
EXT NEUROTENSIN: ABNORMAL
EXT NOREPINEPHRINE: ABNORMAL
EXT NORMETANEPHRINE: ABNORMAL
EXT NSE: ABNORMAL
EXT OCTREOTIDE LEVEL: ABNORMAL
EXT PANCREASTATIN CAMB: ABNORMAL
EXT PANCREASTATIN ISI: ABNORMAL
EXT PANCREATIC POLYPEPTIDE: ABNORMAL
EXT PHOSPHORUS: ABNORMAL
EXT PLATELETS: 298 K/UL (ref 150–400)
EXT POTASSIUM: 4 MMOL/L (ref 3.5–5.3)
EXT PROGRAF LEVEL: ABNORMAL
EXT PROLACTIN: ABNORMAL
EXT PROTEIN TOTAL: 7.6 G/DL (ref 6–8.5)
EXT PROTEIN UA: ABNORMAL
EXT PT: 16.1 SECONDS (ref 12–14.5)
EXT PTH, INTACT: ABNORMAL
EXT PTT: 42.7 SECONDS (ref 22.5–37.2)
EXT RAPAMUNE LEVEL: ABNORMAL
EXT SEROTONIN: ABNORMAL
EXT SODIUM: 140 MMOL/L (ref 135–148)
EXT SOMATOSTATIN: ABNORMAL
EXT SUBSTANCE P: ABNORMAL
EXT TRIGLYCERIDES: ABNORMAL
EXT TRYPTASE: ABNORMAL
EXT TSH: ABNORMAL
EXT URIC ACID: ABNORMAL
EXT URINE AMYLASE U/HR: ABNORMAL
EXT URINE AMYLASE U/L: ABNORMAL
EXT VASOACTIVE INTESTINAL POLYPEPTIDE: ABNORMAL
EXT VITAMIN B12: ABNORMAL
EXT VMA 24 HR URINE: ABNORMAL
EXT WBC: 6.4 K/UL (ref 4.5–10.5)
GLOBULIN: 4.8 G/DL (ref 2.5–4.1)
NEURON SPECIFIC ENOLASE: ABNORMAL

## 2022-05-17 ENCOUNTER — PATIENT MESSAGE (OUTPATIENT)
Dept: NEUROLOGY | Facility: HOSPITAL | Age: 72
End: 2022-05-17

## 2022-05-17 ENCOUNTER — OFFICE VISIT (OUTPATIENT)
Dept: NEUROLOGY | Facility: HOSPITAL | Age: 72
End: 2022-05-17
Attending: SURGERY
Payer: MEDICARE

## 2022-05-17 DIAGNOSIS — C7B.8 SECONDARY NEUROENDOCRINE TUMOR OF LIVER: Primary | ICD-10-CM

## 2022-05-17 DIAGNOSIS — C7A.095 MALIGNANT CARCINOID TUMOR OF MIDGUT: ICD-10-CM

## 2022-05-17 DIAGNOSIS — E34.0 CARCINOID SYNDROME: ICD-10-CM

## 2022-05-17 NOTE — PROGRESS NOTES
" NOLANETS:  [facility]  A collaboration between [facility] and [facility]      PATIENT: Tea Colon  MRN: [id number]  DATE: 5/17/2022    Subjective:      Chief Complaint:  metastatic midgut neuroendocrine tumor     The patient location is: auto  The chief complaint leading to consultation is: followup    Visit type: audiovisual    Face to Face time with patient: 15m  25 minutes of total time spent on the encounter, which includes face to face time and non-face to face time preparing to see the patient (eg, review of tests), Obtaining and/or reviewing separately obtained history, Documenting clinical information in the electronic or other health record, Independently interpreting results (not separately reported) and communicating results to the patient/family/caregiver, or Care coordination (not separately reported).         Each patient to whom he or she provides medical services by telemedicine is:  (1) informed of the relationship between the physician and patient and the respective role of any other health care provider with respect to management of the patient; and (2) notified that he or she may decline to receive medical services by telemedicine and may withdraw from such care at any time.      Overview / HPI: c/o Flushing after meals.  Worse at end of months. Rescue shots did not seem to help much.  Worse with "sugary " foods.    PMH This nice woman has a history of a neuroendocrine tumor that dates back to 2004. According to the patient, she had a small bowel tumor diagnosed via a video capsule endoscopy and underwent resection.  Her interpretation of what was told to her at the time was that it was found early and if it was to return, it would be within 10 years of diagnosis.  However she did not have regular surveillance and it was recently when she went to the emergency department with what was thought to be a " ureteral tract stone that a CT scan done of the abdomen noted abnormalities within her liver suggestive of a metastatic cancer.  A biopsy was consistent with a metastatic neuroendocrine tumor.  Somatostatin receptor inhibitor therapy was initiated.  She had a TACE in fall 2021.        Vitals: There were no vitals taken for this visit. 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     Resolved Ambulatory Problems   Diagnosis Date Noted    No Resolved Ambulatory Problems     Past Medical History:   Diagnosis Date    Cancer    Colon polyp 05/19/2017    Hypertension    Kidney stone    Kidney stone    Liver tumor    Malignant carcinoid tumor of small intestine 2004     Past surgical history:  Past Surgical History:   Procedure Laterality Date    COLONOSCOPY   2016 Dr Severino    CYSTOSCOPY W/ URETERAL STENT PLACEMENT Left 02/11/2021   RPG    GALLBLADDER SURGERY 2003    SMALL INTESTINE SURGERY     Allergies:   No Known Allergies    FAMILY HISTORY:  Family History   Problem Relation Age of Onset    Cancer Mother    Rectal cancer Mother    Heart disease Father    Cancer Sister    Liver disease Sister    Skin cancer Brother    Cancer Brother    Breast cancer Paternal Aunt    Stomach cancer Neg Hx       Medications:  Updated 7/9/2021         Review of Systems   Constitutional: Positive for diaphoresis. Negative for unexpected weight change.   HENT: Negative for facial swelling and hearing loss.    Eyes: Negative for photophobia.   Respiratory: Negative for wheezing and stridor.    Cardiovascular: Positive  for palpitations.        Hypertension   Gastrointestinal: Positive for abdominal pain.   Endocrine: Negative for cold intolerance and heat intolerance.   Genitourinary: Positive for dysuria and flank pain. Negative for difficulty urinating.   Musculoskeletal: Negative for joint swelling and myalgias.   Skin: Negative for rash and wound.   Allergic/Immunologic: Negative for immunocompromised state.   Neurological: Negative for tremors and weakness.   Hematological: Does not bruise/bleed easily.   Psychiatric/Behavioral: Negative.           Objective: (prior visit)      Physical Exam  Constitutional:       General: She is not in acute distress.     Appearance: Normal appearance. She is normal weight. She is not ill-appearing, toxic-appearing or diaphoretic.   HENT:      Head: Normocephalic.      Nose: Nose normal.   Eyes:      Pupils: Pupils are equal, round, and reactive to light.   Cardiovascular:      Rate and Rhythm: Normal rate and regular rhythm.   Pulmonary:      Effort: Pulmonary effort is normal. No respiratory distress.      Breath sounds: No stridor.   Abdominal:      Palpations: Abdomen is soft.   Musculoskeletal:         General: No swelling.   Skin:     General: Skin is warm and dry.      Coloration: Skin is not jaundiced.   Neurological:      Mental Status: She is alert and oriented to person, place, and time.   Psychiatric:         Mood and Affect: Mood normal.         Behavior: Behavior normal.         Thought Content: Thought content normal.         Judgment: Judgment normal.          Laboratory Data:  Neuroendocrine Labs Latest Ref Rng & Units 4/12/2022   EXT SEROTONIN 0 - 420 ng/mL 1,446 (A)   EXT CHROMOGRANIN A 0 - 96 ng/mL    EXT PANCREASTATIN LOIS 10 - 135 pg/mL 768 (A)     Neuroendocrine Labs Latest Ref Rng & Units 9/21/2021   EXT WBC 4.4 - 10.1 K/uL 5.4   RBC 4.00 - 5.40 M/uL    HGB 12.0 - 16.0 gm/dl    EXT HGB 11.3 - 15.4 g/dL 13.2   HCT 37.0 - 48.5 %    EXT HCT 34.3 - 45.7 % 42.5   MCV 82.0 -  95.0 fL    MCH 27.0 - 31.0 pg    MCHC 32 - 36 %    RDW 11.5 - 14.5 %    PLATLETS 150 - 350 K/uL    EXT PLATLETS 117 - 369 K/uL 295     Neuroendocrine Labs Latest Ref Rng & Units 9/21/2021   EXT PT 12.7 - 15.3 sec 13.9   EXT PTT 22.2 - 34.1 sec 29.8   EXT INR 0.86 - 1.15 INR 1.05   GLUCOSE 70 - 110 mg/dl    EXT GLUCOSE 70 - 99 mg/dL 106 (A)   BUN 5 - 23 mg/dl    EXT BUN 9 - 23 mg/dL 10   CREATININE 0.5 - 1.4 mg/dl    EXT CREATININE 0.55 - 1.02 mg/dL 0.76    - 145 mMol/l    EXT  - 147 mmol/L 144   K 3.3 - 5.3 mMol/l    EXT K 3.5 - 5.1 mmol/L 3.7   CHLORIDE 95 - 110 mMol/l    EXT CHLORIDE 98 - 107 mmol/L 108 (A)   CO2 23 - 29 mEq/L    EXT CO2 20 - 31 mmol/L 30   CALCIUM 8.7 - 10.5 mg/dl    EXT CALCIUM 8.3 - 10.6 mg/dL 9.4   PROTEIN, TOTAL 6.0 - 8.4 gm/dl    EXT PROTEIN, TOTAL 5.7 - 8.2 g/dL 5.9   PHOSPHORUS 2.7 - 4.5 mg/dl    ALBUMIN 3.5 - 5.5 g/dl    EXT ALBUMIN 3.2 - 4.8 g/dL 4.1   TOTAL BILIRUBIN 0.1 - 1.0 mg/dl    EXT TOTAL BILIRUBIN 0.3 - 1.2 mg/dL 0.7   ALK PHOSPHATASE 45 - 130 U/L    EXT ALK PHOSPHATASE 46 - 116 IU/L 79   SGOT (AST) 0 - 31 U/L    EXT SGOT (AST) 0 - 34 IU/L 17   SGPT (ALT) 0 - 31 U/L    EXT ALT 10 - 49 IU/L 14       Neuroendocrine Labs Latest Ref Rng & Units 8/27/2021   EXT WBC 4.4 - 10.1 K/uL 6.9   RBC 4.00 - 5.40 M/uL    HGB 12.0 - 16.0 gm/dl    EXT HGB 11.3 - 15.4 g/dL 14.7   HCT 37.0 - 48.5 %    EXT HCT 34.3 - 45.7 % 44.3   EXT PLATLETS 117 - 369 K/uL 313     Neuroendocrine Labs Latest Ref Rng & Units 8/27/2021   EXT GLUCOSE 70 - 99 mg/dL 173 (A)   BUN 5 - 23 mg/dl    EXT BUN 9 - 23 mg/dL 8 (A)   CREATININE 0.5 - 1.4 mg/dl    EXT CREATININE 0.55 - 1.02 mg/dL 0.86    - 145 mMol/l    EXT  - 147 mmol/L 142   K 3.3 - 5.3 mMol/l    EXT K 3.5 - 5.1 mmol/L 3.7   CHLORIDE 95 - 110 mMol/l    EXT CHLORIDE 98 - 107 mmol/L 106   CO2 23 - 29 mEq/L    EXT CO2 20 - 31 mmol/L 28   CALCIUM 8.7 - 10.5 mg/dl    EXT CALCIUM 8.3 - 10.6 mg/dL 10   PROTEIN, TOTAL 6.0 - 8.4 gm/dl    EXT  PROTEIN, TOTAL 5.7 - 8.2 g/dL 6.7   PHOSPHORUS 2.7 - 4.5 mg/dl    ALBUMIN 3.5 - 5.5 g/dl    EXT ALBUMIN 3.2 - 4.8 g/dL 4.6   TOTAL BILIRUBIN 0.1 - 1.0 mg/dl    EXT TOTAL BILIRUBIN 0.3 - 1.2 mg/dL 0.8   ALK PHOSPHATASE 45 - 130 U/L    EXT ALK PHOSPHATASE 46 - 116 IU/L 96   SGOT (AST) 0 - 31 U/L    EXT SGOT (AST) 0 - 34 IU/L 19   SGPT (ALT) 0 - 31 U/L    EXT ALT 10 - 49 IU/L 14     Neuroendocrine Labs Latest Ref Rng & Units 7/20/2021   EXT SEROTONIN 50 - 220 ng/mL 1,661 (A)   EXT CHROMOGRANIN A 0 - 96 ng/mL    EXT PANCREASTATIN LOIS 100.0 - 288.7 pg/mL 852.8 (A)       Neuroendocrine Labs Latest Ref Rng & Units 6/30/2021   EXT SEROTONIN 50 - 220 ng/mL 1,379 (A)   EXT CHROMOGRANIN A 0 - 96 ng/mL 262 (A)     Neuroendocrine Labs Latest Ref Rng & Units 6/30/2021   EXT WBC 4.4 - 10.1 K/uL 6.7   RBC 4.00 - 5.40 M/uL    HGB 12.0 - 16.0 gm/dl    EXT HGB 11.3 - 15.4 g/dL 13.4   HCT 37.0 - 48.5 %    EXT HCT 34.3 - 45.7 % 41.5   MCV 82.0 - 95.0 fL    MCH 27.0 - 31.0 pg    MCHC 32 - 36 %    RDW 11.5 - 14.5 %    PLATLETS 150 - 350 K/uL    EXT PLATLETS 117 - 369 K/uL 304     Neuroendocrine Labs Latest Ref Rng & Units 6/30/2021   EXT GLUCOSE 70 - 99 mg/dL 103 (A)   BUN 5 - 23 mg/dl    EXT BUN 9 - 23 mg/dL 10   CREATININE 0.5 - 1.4 mg/dl    EXT CREATININE 0.55 - 1.02 mg/dL 0.73    - 145 mMol/l    EXT  - 147 mmol/L 146   K 3.3 - 5.3 mMol/l    EXT K 3.5 - 5.1 mmol/L 3.1 (A)   CHLORIDE 95 - 110 mMol/l    EXT CHLORIDE 98 - 107 mmol/L 105   CO2 23 - 29 mEq/L    EXT CO2 20 - 31 mmol/L 31   CALCIUM 8.7 - 10.5 mg/dl    EXT CALCIUM 8.3 - 10.6 mg/dL 8.9   PROTEIN, TOTAL 6.0 - 8.4 gm/dl    EXT PROTEIN, TOTAL 5.7 - 8.2 g/dL 6.0   PHOSPHORUS 2.7 - 4.5 mg/dl    ALBUMIN 3.5 - 5.5 g/dl    EXT ALBUMIN 3.2 - 4.8 g/dL 4.2   TOTAL BILIRUBIN 0.1 - 1.0 mg/dl    EXT TOTAL BILIRUBIN 0.3 - 1.2 mg/dL 0.6   ALK PHOSPHATASE 45 - 130 U/L    EXT ALK PHOSPHATASE 46 - 116 IU/L 90   SGOT (AST) 0 - 31 U/L    EXT SGOT (AST) 0 - 34 IU/L 19   SGPT (ALT) 0 - 31  U/L    EXT ALT 10 - 49 U/L 20     Scans:               Ga68 PET 3/2021           CT A/P with contrast          Mri 4/20/22        Assessment/Impression:         Problem List Items Addressed This Visit     Carcinoid syndrome    Malignant carcinoid tumor of midgut    Secondary neuroendocrine tumor of liver - Primary       Partial response to TACE.  Needs a new PET scan  Rising serotonin levels  Plan:    dotate PET  tumor board after scan SCAN consider additional tace B[place] is cytoreduction versus PRRT depending upon extent of disease   Continue lanreotide

## 2022-05-17 NOTE — PATIENT INSTRUCTIONS
Scans: Dot[facility] PET Scan  ---  due ASAP    Notes From Physician: continue lanreotide injections    Tumor Board: will discuss your case at Tumor Board after PET Scan done, then will call you with recommendations

## 2022-08-19 ENCOUNTER — TELEPHONE (OUTPATIENT)
Dept: HEMATOLOGY/ONCOLOGY | Facility: CLINIC | Age: 72
End: 2022-08-19
Payer: MEDICARE

## 2022-08-19 NOTE — TELEPHONE ENCOUNTER
----- Message from Eryn Solis sent at 8/19/2022  1:15 PM CDT -----  Regarding: Dr. Jain/Oncologist  Contact: Dr. Jain/Oncologist/[phone]/ her personal cell phone  Dr. Jain/Oncologist with [facility] is call to discuss the patient. Please call:  Dr. Jain/Oncologist/[phone]/ her personal cell phone